# Patient Record
Sex: MALE | Race: WHITE | Employment: OTHER | URBAN - METROPOLITAN AREA
[De-identification: names, ages, dates, MRNs, and addresses within clinical notes are randomized per-mention and may not be internally consistent; named-entity substitution may affect disease eponyms.]

---

## 2017-05-12 ENCOUNTER — OFFICE VISIT (OUTPATIENT)
Dept: HEMATOLOGY | Age: 67
End: 2017-05-12

## 2017-05-12 VITALS
SYSTOLIC BLOOD PRESSURE: 157 MMHG | BODY MASS INDEX: 28.7 KG/M2 | OXYGEN SATURATION: 95 % | HEART RATE: 89 BPM | WEIGHT: 197.2 LBS | DIASTOLIC BLOOD PRESSURE: 99 MMHG | TEMPERATURE: 99.5 F

## 2017-05-12 DIAGNOSIS — K74.60 CIRRHOSIS OF LIVER WITHOUT ASCITES, UNSPECIFIED HEPATIC CIRRHOSIS TYPE (HCC): Primary | ICD-10-CM

## 2017-05-12 RX ORDER — POTASSIUM CHLORIDE 20 MEQ/1
20 TABLET, EXTENDED RELEASE ORAL DAILY
Status: ON HOLD | COMMUNITY
Start: 2017-04-17 | End: 2018-03-17

## 2017-05-12 RX ORDER — FUROSEMIDE 40 MG/1
40 TABLET ORAL DAILY
COMMUNITY
Start: 2017-04-20 | End: 2018-11-16

## 2017-05-12 NOTE — MR AVS SNAPSHOT
Visit Information Date & Time Provider Department Dept. Phone Encounter #  
 5/12/2017 12:30 PM Stef Solorzano MD [facility] [address] [facility] [id number] Follow-up Instructions Return in about 3 months (around 8/12/2017) for NP. Your Appointments 6/16/2017 10:15 AM  
Follow Up with Stef Solorzano MD  
[facility] [address] [place] ([facility] Appt Note: Follow up [address] [facility] [phone] [address]  
[phone]  
  
   
 [address] [facility] [address]  
  
    
 7/13/2017  2:00 PM  
PROCEDURE with Stef Solorzano MD  
[facility] [address] [place] ([facility]) Appt Note: EGD  
 [address] [facility] [phone] [facility] [address]  
[address] [facility] [address] [facility] [address]  
  
    
 8/3/2017  1:30 PM  
Follow Up with MICAELA Tyler [facility] [address] [place] ([facility]) Appt Note: Follow up [address] [facility] [phone] [facility] [address]  
[address] [facility] [address] Upcoming Health Maintenance Date Due Hepatitis C Screening 1950 DTaP/Tdap/Td series (1 - Tdap) 6/28/1969 FOBT Q 1 YEAR AGE 50-75 6/28/1998 ZOSTER VACCINE AGE 60> 6/28/2008 GLAUCOMA SCREENING Q2Y 6/28/2013 MEDICARE YEARLY EXAM 6/28/2013 Pneumococcal 65+ Low/Medium Risk (1 of 2 - PCV13) 3/7/2014 INFLUENZA AGE 9 TO ADULT 8/1/2017 Allergies as of 5/12/2017  Review Complete On: 5/12/2017 By: Sia Stanley Severity Noted Reaction Type Reactions Bryan Merry [Propoxyphene N-acetaminophen]  08/31/2012    Other (comments) Extreme emotions Librium [Chlordiazepoxide Hcl]  08/31/2012    Other (comments) Severe shaking and unable to control motor movements Current Immunizations  Reviewed on 3/6/2013 Name Date Influenza Vaccine 12/6/2012 Pneumococcal Polysaccharide (PPSV-23) 3/7/2013  1:26 PM  
  
 Not reviewed this visit You Were Diagnosed With   
  
 Codes Comments Cirrhosis of liver without ascites, unspecified hepatic cirrhosis type ([facility]    -  Primary ICD-10-CM: K74.60 ICD-9-CM: 571.5 Vitals BP Pulse Temp Weight(growth percentile) SpO2 BMI  
 (!) 157/99 89 99.5 °F (37.5 °C) (Tympanic) 197 lb 3.2 oz (89.4 kg) 95% 28.7 kg/m2 Smoking Status Former Smoker BMI and BSA Data Body Mass Index Body Surface Area 28.7 kg/m 2 2.09 m 2 Your Updated Medication List  
  
   
This list is accurate as of: 5/12/17  1:25 PM.  Always use your most recent med list.  
  
  
  
  
 CENTRUM 0.4-162-18 mg Tab Generic drug:  HB-GS-HW-Fe-Min-Lycopen-Lutein Take  by mouth daily. COREG 3.125 mg tablet Generic drug:  carvedilol Take  by mouth two (2) times a day. furosemide 40 mg tablet Commonly known as:  LASIX Take 40 mg by mouth daily. potassium chloride 20 mEq tablet Commonly known as:  K-DUR, KLOR-CON Take 20 mEq by mouth daily. We Performed the Following AFP WITH AFP-L3% [[id number] Custom] CBC WITH AUTOMATED DIFF [93709 CPT(R)] ETHYL ALCOHOL [id number] CPT(R)] HEPATIC FUNCTION PANEL [20094 CPT(R)] METABOLIC PANEL, BASIC [84103 CPT(R)] PROTHROMBIN TIME + INR [07887 CPT(R)] Follow-up Instructions Return in about 3 months (around 8/12/2017) for NP. To-Do List   
 05/12/2017 Imaging:  US ABD LTD   
  
 06/11/2017 GI:  EGD Introducing [facility]! Vladimir Alcaraz introduces [employer] patient portal. Now you can access parts of your medical record, email your doctor's office, and request medication refills online. 1. In your internet browser, go to [email or web address] 2. Click on the First Time User? Click Here link in the Sign In box. You will see the New Member Sign Up page. 3. Enter your [employer] Access Code exactly as it appears below. You will not need to use this code after youve completed the sign-up process. If you do not sign up before the expiration date, you must request a new code. · [employer] Access Code: [email or web address] Expires: 6/28/2017  1:40 PM 
 
4. Enter the last four digits of your Social Security Number (xxxx) and Date of Birth (mm/dd/yyyy) as indicated and click Submit. You will be taken to the next sign-up page. 5. Create a [employer] ID. This will be your [employer] login ID and cannot be changed, so think of one that is secure and easy to remember. 6. Create a [employer] password. You can change your password at any time. 7. Enter your Password Reset Question and Answer. This can be used at a later time if you forget your password. 8. Enter your e-mail address. You will receive e-mail notification when new information is available in [address] 9. Click Sign Up. You can now view and download portions of your medical record. 10. Click the Download Summary menu link to download a portable copy of your medical information. If you have questions, please visit the Frequently Asked Questions section of the [employer] website. Remember, [employer] is NOT to be used for urgent needs. For medical emergencies, dial 911. Now available from your iPhone and Android! Please provide this summary of care documentation to your next provider. Your primary care clinician is listed as Blanca B Shamar. If you have any questions after today's visit, please call [phone].

## 2017-05-12 NOTE — PROGRESS NOTES
[facility] [address], MD, [facility]     April GIOVANNI Gutierrez PA-C Tylene Port, MD, FACP, [address], NP        [address] [facility], [address] Aida Perez  22.     [phone]     FAX: [address] [place] [address], [address] [place] [address], [address]     [phone]     FAX: [phone]       Patient Care Team:  Kathy Pierce MD as PCP - General (Internal Medicine)  Isra Bowling MD (Gastroenterology)      Problem List  Date Reviewed: 10/3/2014          Codes Class Noted    Colon AV malformation ICD-10-CM: Q27.33  ICD-9-CM: 747.61  10/3/2014        Diverticulosis of colon ICD-10-CM: K57.30  ICD-9-CM: 562.10  10/3/2014        Colon polyps ICD-10-CM: K63.5  ICD-9-CM: 211.3  10/3/2014        S/P inguinal hernia repair ICD-10-CM: Z98.890, Z87.19  ICD-9-CM: V45.89  2014        Cirrhosis, alcoholic ([facility] VZP-17-KB: K70.30  ICD-9-CM: 571.2  2014        S/P hip replacement ICD-10-CM: P76.052  ICD-9-CM: V43.64  3/7/2013              Candi Vazquez returns to the [address] for management of 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 The active problem list, all pertinent past medical history, medications, endoscopic studies, radiologic findings and laboratory findings related to the liver disorder were reviewed with the patient. The patient has not been seen since . The patient is a 74 y.o.  male who was first found to have chronic liver disease and cirrhosis in . Ultrasound of the liver was performed in 2014. This suggests cirrhosis. No liver mass lesions noted. The patient has not developed any major complications of cirrhosis to date. The patient's wife  and he started consuming alcohol again in excess between -2017. He developed ascites.   He stopped consuming alcohol. Ascites resolved with step 1 diuretics. He now only consumes only 1-2  Beers per night. The most recent laboratory studies indicate that the AST is elevated, ALT is normal, ALP is elevated, tests of hepatic synthetic and metabolic function are normal, and the platelet count is depressed. The patient notes fatigue. The patient has no limitations in functional activities. The patient has not experienced problems concentrating, swelling of the abdomen, swelling of the lower extremities, hematemesis, hematochezia. ALLERGIES  Allergies   Allergen Reactions    Darvocet A500 [Propoxyphene N-Acetaminophen] Other (comments)     Extreme emotions    Librium [Chlordiazepoxide Hcl] Other (comments)     Severe shaking and unable to control motor movements       MEDICATIONS  Current Outpatient Prescriptions   Medication Sig    furosemide (LASIX) 40 mg tablet Take 40 mg by mouth daily.  potassium chloride (K-DUR, KLOR-CON) 20 mEq tablet Take 20 mEq by mouth daily.  carvedilol (COREG) 3.125 mg tablet Take  by mouth two (2) times a day.  SF-JS-LF-Fe-Min-Lycopen-Lutein (CENTRUM) 0.4-162-18 mg Tab Take  by mouth daily. No current facility-administered medications for this visit. SYSTEM REVIEW NOT RELATED TO LIVER DISEASE OR REVIEWED ABOVE:  Constitution systems: Negative for fever, chills, weight gain, weight loss. Eyes: Negative for visual changes. ENT: Negative for sore throat, painful swallowing. Respiratory: Negative for cough, hemoptysis, SOB. Cardiology: Negative for chest pain, palpitations. GI:  Negative for constipation or diarrhea. : Negative for urinary frequency, dysuria, hematuria, nocturia. Skin: Negative for rash. Hematology: Negative for easy bruising, blood clots. Musculo-skelatal: Negative for back pain, muscle pain, weakness. Neurologic: Negative for headaches, dizziness, vertigo, memory problems not related to HE.   Psychology: Negative for anxiety, depression. FAMILY HISTORY:  The father  of CHF. The mother  of breast cancer. There is no family history of liver disease. SOCIAL HISTORY:  The patient is . The patient has no children. The patient currently smokes 0-4 cigarettes daily. The patient has previously consumed alcohol in excess. The patient has been abstinent from alcohol since 2012. The patient used to work in restaurant management. The patient retired in . PHYSICAL EXAMINATION:  BP (!) 157/99  Pulse 89  Temp 99.5 °F (37.5 °C) (Tympanic)   Wt 197 lb 3.2 oz (89.4 kg)  SpO2 95%  BMI 28.7 kg/m2  General: No acute distress. Eyes: Sclera anicteric. ENT: No oral lesions. Thyroid normal.  Nodes: No adenopathy. Skin: No spider angiomata. No jaundice. No palmar erythema. Respiratory: Lungs clear to auscultation. Cardiovascular: Regular heart rate. No murmurs. No JVD. Abdomen: Soft non-tender. Liver size normal to percussion/palpation. Spleen not palpable. No obvious ascites. Extremities: No edema. No muscle wasting. No gross arthritic changes. Neurologic: Alert and oriented. Cranial nerves grossly intact. No asterixsis. LABORATORY STUDIES:  [facility] [address] [place] [address] Units 2017   WBC 3.4 - 10.8 x10E3/uL 6.2 5.5   ANC 1.4 - 7.0 x10E3/uL 4.1 3.8   HGB 12.6 - 17.7 g/dL 15.3 16.3    - 379 x10E3/uL 177 175   INR 0.8 - 1.2 1.4 (H) 1.3 (H)   AST 0 - 40 IU/L 55 (H) 55 (H)   ALT 0 - 44 IU/L 21 34   Alk Phos 39 - 117 IU/L 135 (H) 112   Bili, Total 0.0 - 1.2 mg/dL 2.8 (H) 0.9   Bili, Direct 0.00 - 0.40 mg/dL 1.08 (H) 0.29   Albumin 3.6 - 4.8 g/dL 3.5 (L) 4.4   BUN 8 - 27 mg/dL 6 (L) 10   Creat 0.76 - 1.27 mg/dL 0.63 (L) 0.84   Na 134 - 144 mmol/L 136 141   K 3.5 - 5.2 mmol/L 4.2 4.0   Cl 96 - 106 mmol/L 97 102   CO2 18 - 29 mmol/L 25 23   Glucose 65 - 99 mg/dL 88 97     SEROLOGIES:  2014. anti-HCV negative.     Serologies Latest Ref Rng & Units 8/4/2014   Hep A Ab, Total Negative Negative   Hep B Surface Ag Negative Negative   Hep B Core Ab, Total Negative Negative   Ferritin 30 - 400 ng/mL 295   Iron % Saturation 15 - 55 % 51   JOYCE, IFA  Negative   ASMCA 0 - 19 Units 14   Alpha-1 antitrypsin level 90 - 200 mg/dL 166     LIVER HISTOLOGY:  Not available or performed    ENDOSCOPIC PROCEDURES:  Not available or performed    RADIOLOGY:  12/2009. CT scan abdomen with and without IV contrast.  Normal appearing liver. No liver mass lesions. No dilated bile ducts. No ascites. 7/2014. Ultrasound of liver. Echogenic consistent with cirrhosis. No liver mass lesions. No dilated bile ducts. No ascites. 5/2017. Ultrasound of liver. Echogenic consistent with cirrhosis. No liver mass lesions. No dilated bile ducts. Moderate ascites. OTHER TESTING:  Not available or performed    ASSESSMENT AND PLAN:  Cirrhosis secondary to Alcohol. Will perform laboratory testing to monitor liver function and degree of liver injury. This will include BMP, Hepatic panel, CBC with platelet count, INR. The AST is elevated. Alkaline phosphatase is elevated. Liver function is depressed. Total bilirubin is elevated. Serum albumin is depressed. INR is   prolonged. The platelet count is normal.      Ascites has resolved with lasix alone and abstinecne. Esophageal varicies have not been previously assessed for with upper endoscopy. Will schedule for EGD to assess for varices and need for banding. Hepatic encephalopathy has not developed to date. There is no need for treatment with lactulose and/or xifaxan at this time. The patient was directed to continue all current medications at the current dosages. There are no contraindications for the patient to take any medications that are necessary for treatment of other medical issues. The patient was counseled regarding alcohol consumption.   He has been abstinent for several years with the exception of 2 brief periods. Thrombocytopenia secondary to cirrhosis. There is no evidence of overt bleeding. There is no indication for platelet transfusions or pharmacologic treatment to increase the platelet count. The need for vaccination against viral hepatitis A and B will be assessed with serologic and instituted as appropriate. [facility] screening has recently been performed and does not suggest [address] The next liver imaging study will be performed in 1/2015. All of the above issues were discussed with the patient. All questions were answered. The patient expressed a clear understanding of the above. [facility] 87 in 1 month.     Stef Solorzano MD  [facility] [place] of [address], [address] [place] [address], [address] [phone]

## 2017-05-13 LAB
ALBUMIN SERPL-MCNC: 3.5 G/DL (ref 3.6–4.8)
ALP SERPL-CCNC: 135 IU/L (ref 39–117)
ALT SERPL-CCNC: 21 IU/L (ref 0–44)
AST SERPL-CCNC: 55 IU/L (ref 0–40)
BASOPHILS # BLD AUTO: 0.1 X10E3/UL (ref 0–0.2)
BASOPHILS NFR BLD AUTO: 1 %
BILIRUB DIRECT SERPL-MCNC: 1.08 MG/DL (ref 0–0.4)
BILIRUB SERPL-MCNC: 2.8 MG/DL (ref 0–1.2)
BUN SERPL-MCNC: 6 MG/DL (ref 8–27)
BUN/CREAT SERPL: 10 (ref 10–24)
CALCIUM SERPL-MCNC: 8.9 MG/DL (ref 8.6–10.2)
CHLORIDE SERPL-SCNC: 97 MMOL/L (ref 96–106)
CO2 SERPL-SCNC: 25 MMOL/L (ref 18–29)
CREAT SERPL-MCNC: 0.63 MG/DL (ref 0.76–1.27)
EOSINOPHIL # BLD AUTO: 0.1 X10E3/UL (ref 0–0.4)
EOSINOPHIL NFR BLD AUTO: 2 %
ERYTHROCYTE [DISTWIDTH] IN BLOOD BY AUTOMATED COUNT: 15.2 % (ref 12.3–15.4)
GLUCOSE SERPL-MCNC: 88 MG/DL (ref 65–99)
HCT VFR BLD AUTO: 44.4 % (ref 37.5–51)
HGB BLD-MCNC: 15.3 G/DL (ref 12.6–17.7)
IMM GRANULOCYTES # BLD: 0 X10E3/UL (ref 0–0.1)
IMM GRANULOCYTES NFR BLD: 0 %
INR PPP: 1.4 (ref 0.8–1.2)
LYMPHOCYTES # BLD AUTO: 1 X10E3/UL (ref 0.7–3.1)
LYMPHOCYTES NFR BLD AUTO: 17 %
MCH RBC QN AUTO: 33.6 PG (ref 26.6–33)
MCHC RBC AUTO-ENTMCNC: 34.5 G/DL (ref 31.5–35.7)
MCV RBC AUTO: 98 FL (ref 79–97)
MONOCYTES # BLD AUTO: 0.9 X10E3/UL (ref 0.1–0.9)
MONOCYTES NFR BLD AUTO: 14 %
NEUTROPHILS # BLD AUTO: 4.1 X10E3/UL (ref 1.4–7)
NEUTROPHILS NFR BLD AUTO: 66 %
PLATELET # BLD AUTO: 177 X10E3/UL (ref 150–379)
POTASSIUM SERPL-SCNC: 4.2 MMOL/L (ref 3.5–5.2)
PROT SERPL-MCNC: 7.7 G/DL (ref 6–8.5)
PROTHROMBIN TIME: 14.9 SEC (ref 9.1–12)
RBC # BLD AUTO: 4.55 X10E6/UL (ref 4.14–5.8)
SODIUM SERPL-SCNC: 136 MMOL/L (ref 134–144)
WBC # BLD AUTO: 6.2 X10E3/UL (ref 3.4–10.8)

## 2017-05-15 LAB
AFP L3 MFR SERPL: 8.4 % (ref 0–9.9)
AFP SERPL-MCNC: 8.9 NG/ML (ref 0–8)
ETHANOL BLD GC-MCNC: 0.04 %

## 2017-05-19 ENCOUNTER — HOSPITAL ENCOUNTER (OUTPATIENT)
Dept: ULTRASOUND IMAGING | Age: 67
Discharge: HOME | End: 2017-05-19
Attending: INTERNAL MEDICINE
Payer: MEDICARE

## 2017-05-19 DIAGNOSIS — K74.60 CIRRHOSIS OF LIVER WITHOUT ASCITES, UNSPECIFIED HEPATIC CIRRHOSIS TYPE (HCC): ICD-10-CM

## 2017-05-19 PROCEDURE — 76705 ECHO EXAM OF ABDOMEN: CPT

## 2018-03-14 PROBLEM — K70.11 ASCITES DUE TO CHRONIC ALCOHOLIC HEPATITIS: Status: ACTIVE | Noted: 2018-03-14

## 2018-03-29 ENCOUNTER — OFFICE VISIT (OUTPATIENT)
Dept: SURGERY | Age: 68
End: 2018-03-29

## 2018-03-29 VITALS
SYSTOLIC BLOOD PRESSURE: 116 MMHG | BODY MASS INDEX: 30.02 KG/M2 | HEART RATE: 89 BPM | HEIGHT: 69 IN | WEIGHT: 202.7 LBS | DIASTOLIC BLOOD PRESSURE: 80 MMHG

## 2018-03-29 DIAGNOSIS — K70.31 ALCOHOLIC CIRRHOSIS OF LIVER WITH ASCITES (HCC): ICD-10-CM

## 2018-03-29 DIAGNOSIS — N62 GYNECOMASTIA, MALE: ICD-10-CM

## 2018-03-29 DIAGNOSIS — R59.1 LYMPHADENOPATHY: Primary | ICD-10-CM

## 2018-03-29 NOTE — PROGRESS NOTES
CC: knots in neck         Sore knots under nipples    ASAEL Barkley is a 69 y.o. male who was referred for painful bilateral gynecomastia and knot in right neck. The knot has been in his neck since last Fall and is getting smaller. He has noted an area on his left neck now. He denies any ear or teeth problems or any known infections. He has had no workup on neck masses. He is a past smoker and drinker. No sore throat  or hoarseness. He has also had some tender knots under his areola that have been shown to be gynecomastia on mmg. ROS   As outlined above    PE  Visit Vitals    /80 (BP 1 Location: Left arm, BP Patient Position: Sitting)    Pulse 89    Ht 5' 9\" (1.753 m)    Wt 202 lb 11.2 oz (91.9 kg)    BMI 29.93 kg/m2     Physical Exam   HENT:   Head:       No other lymph nodes are palpable in neck  Thyroid in not enlarged. Axilla without masses  Bilateral tender nodules under both areola    IMP  1)  Lymphadenopathy of neck  Because they are getting smaller, most likely benign but given his history he needs a CT scan of his neck to make sure he does not have a ENT cancer or other nodes. 2) benign gynecomastia because of liver failure    PLAN  1) CT scan of neck    2) suggested decreasing caffeine and chocolate and green tea and adding Vit E to his meds. He is already on a vitamin. He should try to get 600-800 international units a day .   Try that for two months, if no relief from pain may need to try Tamoxifen

## 2018-03-29 NOTE — LETTER
3/29/2018 5:37 PM 
 
Patient:  Solange Ortiz YOB: 1950 Date of Visit: 3/29/2018 Dear Laura Coreas MD 
[facility] [facility] [address] VIA Facsimile: [phone] 
 : Thank you for referring Mr. Adán Tellez to me for evaluation/treatment. Below are the relevant portions of my assessment and plan of care. CC: knots in neck Sore knots under nipples ASAEL Ortiz is a 69 y.o. male who was referred for painful bilateral gynecomastia and knot in right neck. The knot has been in his neck since last Fall and is getting smaller. He has noted an area on his left neck now. He denies any ear or teeth problems or any known infections. He has had no workup on neck masses. He is a past smoker and drinker. No sore throat  or hoarseness. He has also had some tender knots under his areola that have been shown to be gynecomastia on mmg. ROS As outlined above PE Visit Vitals  /80 (BP 1 Location: Left arm, BP Patient Position: Sitting)  Pulse 89  
 Ht 5' 9\" (1.753 m)  Wt 202 lb 11.2 oz (91.9 kg)  BMI 29.93 kg/m2 Physical Exam  
HENT:  
Head: No other lymph nodes are palpable in neck Thyroid in not enlarged. Axilla without masses Bilateral tender nodules under both areola IMP 1)  Lymphadenopathy of neck Because they are getting smaller, most likely benign but given his history he needs a CT scan of his neck to make sure he does not have a ENT cancer or other nodes. 2) benign gynecomastia because of liver failure PLAN 
1) CT scan of neck 2) suggested decreasing caffeine and chocolate and green tea and adding Vit E to his meds. He is already on a vitamin. He should try to get 600-800 international units a day . Try that for two months, if no relief from pain may need to try Tamoxifen If you have questions, please do not hesitate to call me. I look forward to following Mr. Louis Schaumann along with you. Sincerely, 
 
 
Carlton Gosselin, MD

## 2018-07-26 ENCOUNTER — OFFICE VISIT (OUTPATIENT)
Dept: SURGERY | Age: 68
End: 2018-07-26

## 2018-07-26 VITALS
HEART RATE: 93 BPM | DIASTOLIC BLOOD PRESSURE: 74 MMHG | BODY MASS INDEX: 29.04 KG/M2 | HEIGHT: 69 IN | WEIGHT: 196.1 LBS | SYSTOLIC BLOOD PRESSURE: 107 MMHG

## 2018-07-26 DIAGNOSIS — K70.11 ASCITES DUE TO CHRONIC ALCOHOLIC HEPATITIS: Primary | ICD-10-CM

## 2018-07-26 RX ORDER — SODIUM CHLORIDE, SODIUM LACTATE, POTASSIUM CHLORIDE, CALCIUM CHLORIDE 600; 310; 30; 20 MG/100ML; MG/100ML; MG/100ML; MG/100ML
200 INJECTION, SOLUTION INTRAVENOUS CONTINUOUS
Status: CANCELLED | OUTPATIENT
Start: 2018-07-26 | End: 2018-07-27

## 2018-07-26 NOTE — PROGRESS NOTES
[address] [facility], [address] [facility]  Phone: [phone] Fax: [phone]                                              HISTORY AND PHYSICAL EXAM    NAME: Amari Sousa  : 1950    Chief Complaint:   Peritoneal ascities    History: Amari Sousa is a 77 y.o. male with peritoneal ascites secondary to alcoholic cirrhosis. He is requiring every 3 week aspirations and presents for placement of a peritoneal catheter for drainage. His next drainage is . Past Medical History:   Diagnosis Date    Arrhythmia     intermittent tachycardia    Breast lump     bilateral 6-8 weeks    Congestive heart failure (HCC)     Hypertension     Liver disease     alcoholic cirrhosis    Psychiatric disorder     anxiety and depression     Past Surgical History:   Procedure Laterality Date    HX COLONOSCOPY      HX HEENT      oral surgery    HX HERNIA REPAIR Right 2009    ingunial    HX ORTHOPAEDIC  2012    REMOVAL OF PROSTALAC IMPLANT, REIMPLANTATION LEFT TOTAL HIP ARTHROPLASTY    REVISE TOTAL HIP REPLACEMENT Left 2013    TOTAL HIP ARTHROPLASTY Bilateral     bilateral hip replacement        Current Outpatient Prescriptions   Medication Sig Dispense Refill    potassium chloride (K-DUR, KLOR-CON) 20 mEq tablet Take 1 Tab by mouth two (2) times a day. 60 Tab 0    furosemide (LASIX) 40 mg tablet Take 40 mg by mouth daily.  carvedilol (COREG) 3.125 mg tablet Take  by mouth two (2) times a day.  GU-FU-II-Fe-Min-Lycopen-Lutein (CENTRUM) 0.4-162-18 mg Tab Take  by mouth daily.          Allergies   Allergen Reactions    Darvocet A500 [Propoxyphene N-Acetaminophen] Other (comments)     Extreme emotions    Librium [Chlordiazepoxide Hcl] Other (comments)     Severe shaking and unable to control motor movements     Social History     Social History    Marital status:      Spouse name: N/A    Number of children: N/A    Years of education: N/A     Occupational History  Not on file. Social History Main Topics    Smoking status: Former Smoker     Quit date: 2/27/2013    Smokeless tobacco: Never Used    Alcohol use Yes      Comment: last drink 2 1/2 weeks ago \"a beer\"    Drug use: No      Comment: H/O in the past    Sexual activity: Not on file     Other Topics Concern    Not on file     Social History Narrative     Family History   Problem Relation Age of Onset    Cancer Mother     Breast Cancer Mother     Heart Disease Father     Cancer Brother      lung       Patient Active Problem List   Diagnosis Code    S/P hip replacement Z96.649    S/P inguinal hernia repair Z98.890, Z87.19    Cirrhosis, alcoholic ([facility] E67.39    Colon AV malformation Q27.33    Diverticulosis of colon K57.30    Colon polyps K63.5    Ascites due to chronic alcoholic hepatitis J94.72       Review of Systems:  Review of Systems   Constitutional: Negative for chills, fever, malaise/fatigue and weight loss. HENT: Positive for tinnitus. Negative for congestion, ear discharge, ear pain, hearing loss, nosebleeds and sore throat. Eyes: Negative for blurred vision, double vision, pain, discharge and redness. Respiratory: Negative for cough, sputum production, shortness of breath and wheezing. Cardiovascular: Positive for leg swelling. Negative for chest pain and palpitations. Gastrointestinal: Negative for abdominal pain, blood in stool, constipation, diarrhea, heartburn, melena, nausea and vomiting. Genitourinary: Negative for frequency, hematuria and urgency. Musculoskeletal: Positive for joint pain. Negative for back pain and neck pain. Skin: Negative for itching and rash. Neurological: Negative for dizziness, tremors, focal weakness, seizures, weakness and headaches. Endo/Heme/Allergies: Does not bruise/bleed easily. Psychiatric/Behavioral: Negative for depression and memory loss. The patient is not nervous/anxious and does not have insomnia.         Physical Exam:  Visit Vitals    /74 (BP 1 Location: Left arm, BP Patient Position: Sitting)    Pulse 93    Ht 5' 9\" (1.753 m)    Wt 196 lb 1.6 oz (89 kg)    BMI 28.96 kg/m2       Physical Exam   Constitutional: He is oriented to person, place, and time. No distress. Thin white male with signs of muscle atrophy   HENT:   Head: Normocephalic and atraumatic. Right Ear: External ear normal.   Left Ear: External ear normal.   Nose: Nose normal.   Mouth/Throat: Oropharynx is clear and moist. No oropharyngeal exudate. Eyes: EOM are normal. Pupils are equal, round, and reactive to light. No scleral icterus. Neck: No thyromegaly present. Cardiovascular: Normal rate, regular rhythm and normal heart sounds. Exam reveals no friction rub. No murmur heard. Pulmonary/Chest: Effort normal and breath sounds normal. He has no wheezes. He has no rales. Abdominal: Soft. He exhibits distension and mass. There is no tenderness. Distended abdomen with fluid wave  Umbilical hernia, reducable with a 1.2 cm defect. Musculoskeletal: Normal range of motion. Lymphadenopathy:     He has no cervical adenopathy. Neurological: He is alert and oriented to person, place, and time. Skin: Skin is warm and dry. No rash noted. No erythema. Psychiatric: He has a normal mood and affect. His behavior is normal. Judgment and thought content normal.       Impression:  Peritoneal ascites secondary to alcoholic cirrhosis  Umbilical hernia. Plan:  Insertion of peritoneal catheter on 7/38/09  Risks and complications were explained to patient and they voiced understanding.     Alyse Bowens M.D.

## 2018-09-06 ENCOUNTER — OFFICE VISIT (OUTPATIENT)
Dept: SURGERY | Age: 68
End: 2018-09-06

## 2018-09-06 VITALS
HEIGHT: 69 IN | DIASTOLIC BLOOD PRESSURE: 72 MMHG | SYSTOLIC BLOOD PRESSURE: 96 MMHG | HEART RATE: 94 BPM | WEIGHT: 180.9 LBS | BODY MASS INDEX: 26.79 KG/M2

## 2018-09-06 DIAGNOSIS — Z99.2 PERITONEAL DIALYSIS CATHETER IN PLACE (HCC): ICD-10-CM

## 2018-09-06 DIAGNOSIS — K70.11 ASCITES DUE TO CHRONIC ALCOHOLIC HEPATITIS: Primary | ICD-10-CM

## 2018-09-07 NOTE — PROGRESS NOTES
Patient returns for follow up of peritoneal catheter placement for alcoholic ascites. He has used the catheter several times and is draining several liters weekly. No problems. He has not changed dressing. He is relating some complicated cleaning and reusing of the tubing, etc to drain the 2 liters as the bags are only one liter. He would like 2 liter bags. No c/o  Soreness better  Appetite good  Bowels okay      EXAM:  Wounds look good  No signs of infection    Ordered 9 catheters a month and 5 dressings a month. Stressed sterility and try to change the dressing frequently. I ordered 2 liter bags but that was not an option.   Talked about how to remove adhesive and change dressing      RTO prn

## 2018-11-13 ENCOUNTER — HOSPITAL ENCOUNTER (INPATIENT)
Age: 68
LOS: 1 days | Discharge: HOME HEALTH | DRG: 641 | End: 2018-11-16
Attending: EMERGENCY MEDICINE | Admitting: INTERNAL MEDICINE
Payer: MEDICARE

## 2018-11-13 ENCOUNTER — APPOINTMENT (OUTPATIENT)
Dept: MRI IMAGING | Age: 68
DRG: 641 | End: 2018-11-13
Attending: INTERNAL MEDICINE
Payer: MEDICARE

## 2018-11-13 DIAGNOSIS — H53.2 DOUBLE VISION: Primary | ICD-10-CM

## 2018-11-13 DIAGNOSIS — R42 DIZZINESS AND GIDDINESS: ICD-10-CM

## 2018-11-13 DIAGNOSIS — E72.20 HYPERAMMONEMIA (HCC): ICD-10-CM

## 2018-11-13 DIAGNOSIS — E87.1 HYPONATREMIA: ICD-10-CM

## 2018-11-13 DIAGNOSIS — K70.31 ALCOHOLIC CIRRHOSIS OF LIVER WITH ASCITES (HCC): ICD-10-CM

## 2018-11-13 DIAGNOSIS — I65.23 BILATERAL CAROTID ARTERY STENOSIS: ICD-10-CM

## 2018-11-13 DIAGNOSIS — E87.5 ACUTE HYPERKALEMIA: ICD-10-CM

## 2018-11-13 LAB
ALBUMIN SERPL-MCNC: 1.8 G/DL (ref 3.5–5)
ALBUMIN/GLOB SERPL: 0.5 {RATIO} (ref 1.1–2.2)
ALP SERPL-CCNC: 141 U/L (ref 45–117)
ALT SERPL-CCNC: 29 U/L (ref 12–78)
AMMONIA PLAS-SCNC: 43 UMOL/L
ANION GAP SERPL CALC-SCNC: 6 MMOL/L (ref 5–15)
ANION GAP SERPL CALC-SCNC: 7 MMOL/L (ref 5–15)
ANION GAP SERPL CALC-SCNC: 7 MMOL/L (ref 5–15)
APPEARANCE UR: CLEAR
AST SERPL-CCNC: 50 U/L (ref 15–37)
BACTERIA URNS QL MICRO: NEGATIVE /HPF
BILIRUB SERPL-MCNC: 1.6 MG/DL (ref 0.2–1)
BILIRUB UR QL CFM: NEGATIVE
BUN SERPL-MCNC: 21 MG/DL (ref 6–20)
BUN SERPL-MCNC: 22 MG/DL (ref 6–20)
BUN SERPL-MCNC: 22 MG/DL (ref 6–20)
BUN/CREAT SERPL: 24 (ref 12–20)
BUN/CREAT SERPL: 24 (ref 12–20)
BUN/CREAT SERPL: 25 (ref 12–20)
CALCIUM SERPL-MCNC: 7.6 MG/DL (ref 8.5–10.1)
CALCIUM SERPL-MCNC: 7.8 MG/DL (ref 8.5–10.1)
CALCIUM SERPL-MCNC: 8.1 MG/DL (ref 8.5–10.1)
CHLORIDE SERPL-SCNC: 100 MMOL/L (ref 97–108)
CHLORIDE SERPL-SCNC: 98 MMOL/L (ref 97–108)
CHLORIDE SERPL-SCNC: 98 MMOL/L (ref 97–108)
CO2 SERPL-SCNC: 21 MMOL/L (ref 21–32)
CO2 SERPL-SCNC: 21 MMOL/L (ref 21–32)
CO2 SERPL-SCNC: 22 MMOL/L (ref 21–32)
COLOR UR: ABNORMAL
CREAT SERPL-MCNC: 0.88 MG/DL (ref 0.7–1.3)
CREAT SERPL-MCNC: 0.89 MG/DL (ref 0.7–1.3)
CREAT SERPL-MCNC: 0.93 MG/DL (ref 0.7–1.3)
CREAT UR-MCNC: 204 MG/DL
EPITH CASTS URNS QL MICRO: ABNORMAL /LPF
ERYTHROCYTE [DISTWIDTH] IN BLOOD BY AUTOMATED COUNT: 14 % (ref 11.5–14.5)
GLOBULIN SER CALC-MCNC: 4 G/DL (ref 2–4)
GLUCOSE SERPL-MCNC: 102 MG/DL (ref 65–100)
GLUCOSE SERPL-MCNC: 106 MG/DL (ref 65–100)
GLUCOSE SERPL-MCNC: 98 MG/DL (ref 65–100)
GLUCOSE UR STRIP.AUTO-MCNC: NEGATIVE MG/DL
HCT VFR BLD AUTO: 38.7 % (ref 36.6–50.3)
HGB BLD-MCNC: 13.5 G/DL (ref 12.1–17)
HGB UR QL STRIP: NEGATIVE
HYALINE CASTS URNS QL MICRO: ABNORMAL /LPF (ref 0–5)
KETONES UR QL STRIP.AUTO: ABNORMAL MG/DL
LEUKOCYTE ESTERASE UR QL STRIP.AUTO: ABNORMAL
MCH RBC QN AUTO: 32.8 PG (ref 26–34)
MCHC RBC AUTO-ENTMCNC: 34.9 G/DL (ref 30–36.5)
MCV RBC AUTO: 93.9 FL (ref 80–99)
NITRITE UR QL STRIP.AUTO: NEGATIVE
NRBC # BLD: 0 K/UL (ref 0–0.01)
NRBC BLD-RTO: 0 PER 100 WBC
OSMOLALITY SERPL: 274 MOSM/KG H2O
OSMOLALITY UR: 927 MOSM/KG H2O
PH UR STRIP: 6 [PH] (ref 5–8)
PLATELET # BLD AUTO: 155 K/UL (ref 150–400)
PMV BLD AUTO: 8.9 FL (ref 8.9–12.9)
POTASSIUM SERPL-SCNC: 4.9 MMOL/L (ref 3.5–5.1)
POTASSIUM SERPL-SCNC: 5.4 MMOL/L (ref 3.5–5.1)
POTASSIUM SERPL-SCNC: 5.6 MMOL/L (ref 3.5–5.1)
PROT SERPL-MCNC: 5.8 G/DL (ref 6.4–8.2)
PROT UR STRIP-MCNC: NEGATIVE MG/DL
RBC # BLD AUTO: 4.12 M/UL (ref 4.1–5.7)
RBC #/AREA URNS HPF: ABNORMAL /HPF (ref 0–5)
SODIUM SERPL-SCNC: 126 MMOL/L (ref 136–145)
SODIUM SERPL-SCNC: 127 MMOL/L (ref 136–145)
SODIUM SERPL-SCNC: 127 MMOL/L (ref 136–145)
SODIUM UR-SCNC: 5 MMOL/L
SP GR UR REFRACTOMETRY: 1.03 (ref 1–1.03)
TROPONIN I SERPL-MCNC: <0.05 NG/ML
UA: UC IF INDICATED,UAUC: ABNORMAL
UROBILINOGEN UR QL STRIP.AUTO: 1 EU/DL (ref 0.2–1)
WBC # BLD AUTO: 8.8 K/UL (ref 4.1–11.1)
WBC URNS QL MICRO: ABNORMAL /HPF (ref 0–4)

## 2018-11-13 PROCEDURE — 36415 COLL VENOUS BLD VENIPUNCTURE: CPT

## 2018-11-13 PROCEDURE — 97116 GAIT TRAINING THERAPY: CPT

## 2018-11-13 PROCEDURE — 80053 COMPREHEN METABOLIC PANEL: CPT

## 2018-11-13 PROCEDURE — 81001 URINALYSIS AUTO W/SCOPE: CPT

## 2018-11-13 PROCEDURE — 74011250637 HC RX REV CODE- 250/637: Performed by: INTERNAL MEDICINE

## 2018-11-13 PROCEDURE — 82570 ASSAY OF URINE CREATININE: CPT

## 2018-11-13 PROCEDURE — 83930 ASSAY OF BLOOD OSMOLALITY: CPT

## 2018-11-13 PROCEDURE — 99218 HC RM OBSERVATION: CPT

## 2018-11-13 PROCEDURE — 74011250636 HC RX REV CODE- 250/636: Performed by: INTERNAL MEDICINE

## 2018-11-13 PROCEDURE — 85027 COMPLETE CBC AUTOMATED: CPT

## 2018-11-13 PROCEDURE — 70551 MRI BRAIN STEM W/O DYE: CPT

## 2018-11-13 PROCEDURE — 93880 EXTRACRANIAL BILAT STUDY: CPT

## 2018-11-13 PROCEDURE — 80048 BASIC METABOLIC PNL TOTAL CA: CPT

## 2018-11-13 PROCEDURE — G8978 MOBILITY CURRENT STATUS: HCPCS

## 2018-11-13 PROCEDURE — 82140 ASSAY OF AMMONIA: CPT

## 2018-11-13 PROCEDURE — 83935 ASSAY OF URINE OSMOLALITY: CPT

## 2018-11-13 PROCEDURE — 99283 EMERGENCY DEPT VISIT LOW MDM: CPT

## 2018-11-13 PROCEDURE — G8980 MOBILITY D/C STATUS: HCPCS

## 2018-11-13 PROCEDURE — G8979 MOBILITY GOAL STATUS: HCPCS

## 2018-11-13 PROCEDURE — 97161 PT EVAL LOW COMPLEX 20 MIN: CPT

## 2018-11-13 PROCEDURE — 84300 ASSAY OF URINE SODIUM: CPT

## 2018-11-13 PROCEDURE — 94761 N-INVAS EAR/PLS OXIMETRY MLT: CPT

## 2018-11-13 PROCEDURE — 84484 ASSAY OF TROPONIN QUANT: CPT

## 2018-11-13 RX ORDER — GUAIFENESIN 100 MG/5ML
81 LIQUID (ML) ORAL DAILY
Status: DISCONTINUED | OUTPATIENT
Start: 2018-11-13 | End: 2018-11-16 | Stop reason: HOSPADM

## 2018-11-13 RX ORDER — SODIUM CHLORIDE 9 MG/ML
75 INJECTION, SOLUTION INTRAVENOUS CONTINUOUS
Status: DISCONTINUED | OUTPATIENT
Start: 2018-11-13 | End: 2018-11-14

## 2018-11-13 RX ORDER — METOLAZONE 5 MG/1
5 TABLET ORAL DAILY
Status: DISCONTINUED | OUTPATIENT
Start: 2018-11-13 | End: 2018-11-13

## 2018-11-13 RX ORDER — ONDANSETRON 2 MG/ML
4 INJECTION INTRAMUSCULAR; INTRAVENOUS
Status: DISCONTINUED | OUTPATIENT
Start: 2018-11-13 | End: 2018-11-16 | Stop reason: HOSPADM

## 2018-11-13 RX ORDER — SODIUM CHLORIDE 0.9 % (FLUSH) 0.9 %
5-10 SYRINGE (ML) INJECTION EVERY 8 HOURS
Status: DISCONTINUED | OUTPATIENT
Start: 2018-11-13 | End: 2018-11-16 | Stop reason: HOSPADM

## 2018-11-13 RX ORDER — BISACODYL 5 MG
5 TABLET, DELAYED RELEASE (ENTERIC COATED) ORAL DAILY PRN
Status: DISCONTINUED | OUTPATIENT
Start: 2018-11-13 | End: 2018-11-16 | Stop reason: HOSPADM

## 2018-11-13 RX ORDER — SODIUM CHLORIDE 0.9 % (FLUSH) 0.9 %
5-10 SYRINGE (ML) INJECTION AS NEEDED
Status: DISCONTINUED | OUTPATIENT
Start: 2018-11-13 | End: 2018-11-16 | Stop reason: HOSPADM

## 2018-11-13 RX ORDER — FUROSEMIDE 40 MG/1
40 TABLET ORAL DAILY
Status: DISCONTINUED | OUTPATIENT
Start: 2018-11-13 | End: 2018-11-13

## 2018-11-13 RX ORDER — LABETALOL HCL 20 MG/4 ML
5 SYRINGE (ML) INTRAVENOUS
Status: DISCONTINUED | OUTPATIENT
Start: 2018-11-13 | End: 2018-11-16 | Stop reason: HOSPADM

## 2018-11-13 RX ORDER — LANOLIN ALCOHOL/MO/W.PET/CERES
400 CREAM (GRAM) TOPICAL DAILY
Status: DISCONTINUED | OUTPATIENT
Start: 2018-11-13 | End: 2018-11-16 | Stop reason: HOSPADM

## 2018-11-13 RX ORDER — CARVEDILOL 3.12 MG/1
3.12 TABLET ORAL 2 TIMES DAILY
Status: DISCONTINUED | OUTPATIENT
Start: 2018-11-13 | End: 2018-11-15

## 2018-11-13 RX ADMIN — Medication 10 ML: at 18:09

## 2018-11-13 RX ADMIN — ASPIRIN 81 MG 81 MG: 81 TABLET ORAL at 18:06

## 2018-11-13 RX ADMIN — Medication 400 MG: at 18:06

## 2018-11-13 RX ADMIN — Medication 10 ML: at 22:21

## 2018-11-13 RX ADMIN — SODIUM CHLORIDE 75 ML/HR: 900 INJECTION, SOLUTION INTRAVENOUS at 17:36

## 2018-11-13 RX ADMIN — Medication 10 ML: at 22:20

## 2018-11-13 NOTE — CONSULTS
[address] [facility]  MR#: [id number]  : 1950  ACCOUNT #: [de-identified]   DATE OF SERVICE: 2018    REFERRING PHYSICIAN:  Dr. Filippo Smith. REASON FOR CONSULTATION:  Hyperkalemia, hyponatremia. HISTORY OF PRESENT ILLNESS:  The patient is a 56-year-old  male with past medical history significant for alcoholic cirrhosis who has been transferred from [facility] for hyperkalemia and gait instability. The patient was found to have a potassium of 7.1 with sodium of 122 at [facility]. He was treated medically for his high potassium and his repeat potassium this morning is 5.6. Patient has known history of alcoholic cirrhosis. He has history of ascites. In August, he had ascitic drain placed. The patient has been draining every 7-8 days for about 6-7 liters of fluid. He drained himself on last Saturday, about 6 liters of fluid. The patient has been on diuretics as an outpatient. He is on Lasix and Aldactone. He was also on metolazone, which he has not taken recently. He is on KCl 20 mEq 2 tablets twice a day. He has been taking 80 mEq of KCl along with he has been eating peach every day. The patient looking at his old labs from 2018, his sodium was 138 and potassium was 3.30 at that time. Patient has history of hypertension. No history of diabetes or kidney stones. He denies any vomiting or diarrhea. No recent change in appetite. PAST MEDICAL HISTORY:  Hypertension, cirrhosis, alcoholic cirrhosis, anxiety, depression. PAST SURGICAL HISTORY:    1. Colonoscopy. 2.  Inguinal hernia repair. 3.  Left hip arthroplasty. SOCIAL HISTORY:  Ex-smoker, lives alone, . Last alcohol abuse in 2017. FAMILY HISTORY:  Positive for breast cancer to the mother. Father had heart disease. Brother had lung cancer. ALLERGIES:  DARVOCET AND ERYTHROMYCIN.     HOME MEDICATIONS:  Aldactone, Lasix, magnesium oxide, Coreg, multivitamin, metolazone, not used recently. REVIEW OF SYSTEMS:  As per HPI. Total 11 systems reviewed. They are essentially negative. Patient reports double vision and generalized weakness. No dysuria, hematuria, no urgency or frequency. No fever or chills. PHYSICAL EXAMINATION:  VITAL SIGNS:  Pulse rate 92, blood pressure 99/61, respiratory rate 20. GENERAL:  Patient is lying in bed, comfortable, not in apparent distress. Alert, awake, oriented x 3. NECK:  Supple. No palpable neck mass. EYES:  No conjunctival pallor or scleral icterus. Oral mucosa is dry. LUNGS:  Clear to auscultation bilaterally. No wheezing or crackles. CARDIOVASCULAR:  Normal S1, S2, regular rate and rhythm. No murmurs. ABDOMEN:  Soft, nontender, bowel sounds present. Abdominal drain present. EXTREMITIES:  No edema. NEUROLOGIC:  Nonfocal.  SKIN:  No rash. JOINTS:  No joint effusion or tenderness. LABORATORY AND DIAGNOSTIC DATA:  Labs at \[facility]\"" on 11/12/2018:  Sodium 122, potassium 7.1, BUN 23, creatinine 1.0, albumin 1.9. Repeat labs this morning:  Sodium 127, potassium 5.6, BUN 21, creatinine 0.89, hemoglobin 13.5. CT head:  He had acute intracranial process seen. EKG:  Normal sinus rhythm, no significant ST-T changes. IMPRESSION:  1. Hyperkalemia due to high potassium diet, potassium supplementation and Aldactone use. 2.  Hyponatremia, likely due to dehydration, although cirrhosis could be contributing to his hyponatremia. His urine out flow is 927 and urine sodium is 5 which suggests prerenal state. 3.  History of alcoholic cirrhosis. 4.  History of ascites requiring ascitic drain placement in 08/2018.  5.  History of hypertension. PLAN:    1. Start IV fluids, normal saline at 75 mL per hour. 2.  Stop KCl. 3.  Hold Aldactone. 4.  Hold Lasix and metolazone. Check BMP in the morning. 5.  Check TSH and cortisol level to finish the workup of hyponatremia. Thanks for consultation.   We will follow the patient with you.      MD CHRISTIAN Zelaya / [place]  D: 11/13/2018 11:02     T: 11/13/2018 11:34  JOB #: [id number]

## 2018-11-13 NOTE — PROGRESS NOTES
Speech pathology note Reviewed chart and note patient admitted with diplopia. Note MRI showed no acute intracranial process. Patient passed the STAND and a regular diet was ordered. Discussed case with RN who reported no SLP-related concerns. Formal SLP evaluation not clinically indicated at this time. Will sign off. Please re-consult if further needs arise. Thank you. Maurita Dance, Dixie Jing., CCC-SLP

## 2018-11-13 NOTE — CONSULTS
Jim Finley         NAME:David Castellanos  DLA:[id number]   [facility]         [id number]  Patient seen and examined    hypona  hyperk    Stop kcl  Stop lasix  Start ivf                  Michael Akhtar [facility]  [facility]  [address], Marycruz Pascual  [place], [address]  Phone - [phone]         Fax - [phone] [facility] Office  [address] [place] [address]  Phone - [phone]        Fax - [phone]     [email or web address]

## 2018-11-13 NOTE — PROGRESS NOTES
Pharmacy Medication Reconciliation The patient was interviewed regarding current PTA medication list, use and drug allergies;  patient and patient's niece were present in room and obtained permission from patient to discuss drug regimen with visitor(s) present. The patient was questioned regarding use of any other inhalers, topical products, over the counter medications, herbal medications, vitamin products or ophthalmic/nasal/otic medication use. Allergy Update: Davocet A500, librium azithromycin Recommendations/Findings: The following amendments were made to the patient[facility] [id number] [facility]:  
1) Additions: none 2) Deletions:  
-metolazone 5mg --not a current medication 
-spironolactone 25mg --not a current medication 3) Changes: (Old regimen: carvedilol 3.125mg PO BiD /New regimen: carvedilol 3.125mg PO QHs) 
(Old regimen: potassium chloride 20meq bid /New regimen: potassium chloride 40meq po BID) -Clarified PTA med list with patient interview, and RX insurance query. PTA medication list was corrected to the following:  
 
Prior to Admission Medications Prescriptions Last Dose Informant Patient Reported? Taking? TA-SS-OZ-Fe-Min-Lycopen-Lutein (CENTRUM) 0.4-162-18 mg Tab 11/12/2018 at Unknown time  Yes Yes Sig: Take 1 Tab by mouth daily. carvedilol (COREG) 3.125 mg tablet 11/12/2018 at 2100  Yes Yes Sig: Take 3.125 mg by mouth nightly. furosemide (LASIX) 40 mg tablet   Yes Yes Sig: Take 40 mg by mouth daily. magnesium oxide (MAG-OX) 400 mg tablet 11/12/2018 at Unknown time Self Yes Yes Sig: Take 400 mg by mouth daily. Indications: hypomagnesemia  
potassium chloride (K-DUR, KLOR-CON) 20 mEq tablet 11/12/2018 at Unknown time  No Yes Sig: Take 1 Tab by mouth two (2) times a day. Patient taking differently: Take 40 mEq by mouth two (2) times a day. Facility-Administered Medications: None Thank you, PAULA Del Rosario

## 2018-11-13 NOTE — H&P
Hospitalist Admission NoteNAME: Giselle Ruiz :  1950 MRN:  [id number] Date/Time:  2018 2:58 AM 
 
Patient PCP: Marco Browne MD 
______________________________________________________________________ Given the patient's current clinical presentation, I have a high level of concern for decompensation if discharged from the emergency department. Complex decision making was performed, which includes reviewing the patient's available past medical records, laboratory results, and x-ray films. My assessment of this patient's clinical condition and my plan of care is as follows. Assessment / Plan: 
 
Gait instability ,chronic intermittent blurry vision   
-neuro consult at Am  
-stroke work up  
-Brain MRI Hyperkalemia  
-admit to tele unit  
-hold Aldactone  
-Hold KCL  
-follow up repeat  
-kayexalate Hyponatremia  
-check serum osmolarity and urine osmolarity  
-check urine NA  
-nephrology consult  
-check NA level Q8 hrs Liver cirrhosis  
-check ammonia level HTN  
-continue home antihypertensive med Code Status: full code Surrogate Decision Maker: DVT Prophylaxis: SCD 
GI Prophylaxis: not indicated Baseline: independent Subjective: CHIEF COMPLAINT: gait instability HISTORY OF PRESENT ILLNESS:    
Pily Funes is a 77 y.o.  male with Liver cirrhosis ,HTN , who transferred from [facility] ED because of gait instability and hyperkalemia 7.1 , according to the patient he had gait instability today and generalized weakness , patient also complaining from double vision intermittent last episode was last Thursday , denies any Dizziness any headache any nausea any vomiting . 'We were asked to admit for work up and evaluation of the above problems. Past Medical History:  
Diagnosis Date  Arrhythmia   
 intermittent tachycardia  Breast lump   
 bilateral 6-8 weeks  Congestive heart failure ([address]  Hypertension  Liver disease   
 alcoholic cirrhosis  Psychiatric disorder   
 anxiety and depression Past Surgical History:  
Procedure Laterality Date  HX COLONOSCOPY    
 HX HEENT    
 oral surgery  HX HERNIA REPAIR Right 2009  
 ingunial  
 HX ORTHOPAEDIC  2012 REMOVAL OF PROSTALAC IMPLANT, REIMPLANTATION LEFT TOTAL HIP ARTHROPLASTY  REVISE TOTAL HIP REPLACEMENT Left 2013  TOTAL HIP ARTHROPLASTY Bilateral   
 bilateral hip replacement Social History Tobacco Use  Smoking status: Former Smoker Last attempt to quit: 2013 Years since quittin.7  Smokeless tobacco: Never Used Substance Use Topics  Alcohol use: No  
  Comment: last  ETOH Brina Castellanos  Family History Problem Relation Age of Onset  Cancer Mother  Breast Cancer Mother  Heart Disease Father  Cancer Brother   
     lung Allergies Allergen Reactions  Darvocet A500 [Propoxyphene N-Acetaminophen] Other (comments) Extreme emotions  Librium [Chlordiazepoxide Hcl] Other (comments) Severe shaking and unable to control motor movements  Azithromycin Diarrhea Reduced Normal Whitney in GI TRACT Prior to Admission medications Medication Sig Start Date End Date Taking? Authorizing Provider  
spironolactone (ALDACTONE) 25 mg tablet Take 25 mg by mouth daily. Indications: Ascites    Blanca Ibanez MD  
metOLazone (ZAROXOLYN) 5 mg tablet Take 5 mg by mouth daily. Indications: edema    Blanca Ibanez MD  
magnesium oxide (MAG-OX) 400 mg tablet Take 400 mg by mouth daily. Indications: hypomagnesemia    Blanca Ibanez MD  
potassium chloride (K-DUR, KLOR-CON) 20 mEq tablet Take 1 Tab by mouth two (2) times a day. 3/17/18   Phyllis No, NP  
furosemide (LASIX) 40 mg tablet Take 40 mg by mouth daily. 17   Provider, Historical  
carvedilol (COREG) 3.125 mg tablet Take  by mouth two (2) times a day. Provider, Historical  
KO-HC-UX-Fe-Min-Lycopen-Lutein (CENTRUM) 0.4-162-18 mg Tab Take  by mouth daily. Provider, Historical  
 
 
REVIEW OF SYSTEMS:    
I am not able to complete the review of systems because: The patient is intubated and sedated The patient has altered mental status due to his acute medical problems The patient has baseline aphasia from prior stroke(s) The patient has baseline dementia and is not reliable historian The patient is in acute medical distress and unable to provide information Total of 12 systems reviewed as follows:   
   POSITIVE= underlined text  Negative = text not underlined General:  fever, chills, sweats, generalized weakness, weight loss/gain,  
   loss of appetite Eyes:    blurred vision, eye pain, loss of vision, double vision ENT:    rhinorrhea, pharyngitis Respiratory:   cough, sputum production, SOB, BEYER, wheezing, pleuritic pain  
Cardiology:   chest pain, palpitations, orthopnea, PND, edema, syncope Gastrointestinal:  abdominal pain , N/V, diarrhea, dysphagia, constipation, bleeding Genitourinary:  frequency, urgency, dysuria, hematuria, incontinence Muskuloskeletal :  arthralgia, myalgia, back pain Hematology:  easy bruising, nose or gum bleeding, lymphadenopathy Dermatological: rash, ulceration, pruritis, color change / jaundice Endocrine:   hot flashes or polydipsia Neurological:  headache, dizziness, confusion, focal weakness, paresthesia,Gait instability Speech difficulties, memory loss, gait difficulty Psychological: Feelings of anxiety, depression, agitation Objective: VITALS:   
There were no vitals taken for this visit. PHYSICAL EXAM: 
 
General:    Alert, cooperative, no distress, appears stated age. HEENT: Atraumatic, anicteric sclerae, pink conjunctivae No oral ulcers, mucosa moist, throat clear, dentition fair Neck:  Supple, symmetrical,  thyroid: non tender Lungs:   Clear to auscultation bilaterally. No Wheezing or Rhonchi. No rales. Chest wall:  No tenderness  No Accessory muscle use. Heart:   Regular  rhythm,  No  murmur   No edema Abdomen:   Soft, non-tender. Not distended. Bowel sounds normal ,ascitic fluid drain in place no erythema Extremities: No cyanosis. No clubbing,   
  Skin turgor normal, Capillary refill normal, Radial dial pulse 2+ Skin:     Not pale. Not Jaundiced  No rashes Psych:  Good insight. Not depressed. Not anxious or agitated. Neurologic: EOMs intact. No facial asymmetry. No aphasia or slurred speech. Symmetrical strength, Sensation grossly intact. Alert and oriented X 4.  
 
_______________________________________________________________________ Care Plan discussed with: 
  Comments Patient y Family RN Care Manager Consultant:     
_______________________________________________________________________ Expected  Disposition:  
Home with Family y HH/PT/OT/RN   
SNF/LTC   
[facility]   
________________________________________________________________________ TOTAL TIME:  60  Minutes Critical Care Provided     Minutes non procedure based Comments  
 y Reviewed previous records  
>50% of visit spent in counseling and coordination of care y Discussion with patient and/or family and questions answered 
  
 
________________________________________________________________________ Signed: Alma Delia Oak, MD 
 
Procedures: see electronic medical records for all procedures/Xrays and details which were not copied into this note but were reviewed prior to creation of Plan. LAB DATA REVIEWED:   
Recent Results (from the past 24 hour(s)) METABOLIC PANEL, COMPREHENSIVE Collection Time: 11/12/18  7:18 PM  
Result Value Ref Range Sodium 122 (L) 136 - 145 mmol/L Potassium 7.1 (HH) 3.5 - 5.1 mmol/L Chloride 95 (L) 97 - 108 mmol/L  
 CO2 21 21 - 32 mmol/L  Anion gap 6 5 - 15 mmol/L  
 Glucose 88 65 - 100 mg/dL BUN 23 (H) 6 - 20 MG/DL Creatinine 1.08 0.70 - 1.30 MG/DL  
 BUN/Creatinine ratio 21 (H) 12 - 20 GFR est AA >60 >60 ml/min/1.73m2 GFR est non-AA >60 >60 ml/min/1.73m2 Calcium 8.3 (L) 8.5 - 10.1 MG/DL Bilirubin, total 1.8 (H) 0.2 - 1.0 MG/DL  
 ALT (SGPT) 31 12 - 78 U/L  
 AST (SGOT) 58 (H) 15 - 37 U/L Alk. phosphatase 169 (H) 45 - 117 U/L Protein, total 6.4 6.4 - 8.2 g/dL Albumin 1.9 (L) 3.5 - 5.0 g/dL Globulin 4.5 (H) 2.0 - 4.0 g/dL A-G Ratio 0.4 (L) 1.1 - 2.2    
CBC WITH AUTOMATED DIFF Collection Time: 11/12/18  7:18 PM  
Result Value Ref Range WBC 9.4 4.1 - 11.1 K/uL  
 RBC 4.53 4.10 - 5.70 M/uL  
 HGB 14.5 12.1 - 17.0 g/dL HCT 41.8 36.6 - 50.3 % MCV 92.3 80.0 - 99.0 FL  
 MCH 32.0 26.0 - 34.0 PG  
 MCHC 34.7 30.0 - 36.5 g/dL  
 RDW 13.7 11.5 - 14.5 % PLATELET 230 821 - 304 K/uL MPV 8.6 (L) 8.9 - 12.9 FL  
 NRBC 0.0 0  WBC ABSOLUTE NRBC 0.00 0.00 - 0.01 K/uL NEUTROPHILS 81 (H) 32 - 75 % LYMPHOCYTES 5 (L) 12 - 49 % MONOCYTES 13 5 - 13 % EOSINOPHILS 1 0 - 7 % BASOPHILS 0 0 - 1 % IMMATURE GRANULOCYTES 0 0.0 - 0.5 % ABS. NEUTROPHILS 7.6 1.8 - 8.0 K/UL  
 ABS. LYMPHOCYTES 0.5 (L) 0.8 - 3.5 K/UL  
 ABS. MONOCYTES 1.2 (H) 0.0 - 1.0 K/UL  
 ABS. EOSINOPHILS 0.1 0.0 - 0.4 K/UL  
 ABS. BASOPHILS 0.0 0.0 - 0.1 K/UL  
 ABS. IMM. GRANS. 0.0 0.00 - 0.04 K/UL  
 DF MANUAL PLATELET COMMENTS ADEQUATE PLATELETS    
 RBC COMMENTS NORMOCYTIC, NORMOCHROMIC PROTHROMBIN TIME + INR Collection Time: 11/12/18  7:18 PM  
Result Value Ref Range INR 1.7 (H) 0.9 - 1.1 Prothrombin time 16.4 (H) 9.0 - 11.1 sec TROPONIN I Collection Time: 11/12/18  7:18 PM  
Result Value Ref Range Troponin-I, Qt. <0.05 <0.05 ng/mL BNP Collection Time: 11/12/18  7:18 PM  
Result Value Ref Range  (H) 0 - 100 pg/mL SAMPLES BEING HELD Collection Time: 11/12/18  7:18 PM  
Result Value Ref Range SAMPLES BEING HELD 1RED COMMENT Add-on orders for these samples will be processed based on acceptable specimen integrity and analyte stability, which may vary by analyte. EKG, 12 LEAD, INITIAL Collection Time: 11/12/18  8:14 PM  
Result Value Ref Range Ventricular Rate 69 BPM  
 Atrial Rate 69 BPM  
 P-R Interval 178 ms QRS Duration 78 ms Q-T Interval 400 ms QTC Calculation (Bezet) 428 ms Calculated P Axis 11 degrees Calculated R Axis 5 degrees Calculated T Axis -2 degrees Diagnosis Normal sinus rhythm Low voltage QRS Inferior infarct , age undetermined Cannot rule out Anterior infarct , age undetermined Abnormal ECG When compared with ECG of 30-NOV-2012 14:57, 
QRS voltage has decreased Minimal criteria for Anterior infarct are now present Inferior infarct is now present T wave amplitude has decreased in Lateral leads GLUCOSE, POC Collection Time: 11/12/18 10:32 PM  
Result Value Ref Range Glucose (POC) 126 (H) 65 - 100 mg/dL Performed by INA VERA(PCT) POTASSIUM Collection Time: 11/12/18 10:45 PM  
Result Value Ref Range Potassium 5.5 (H) 3.5 - 5.1 mmol/L METABOLIC PANEL, BASIC Collection Time: 11/13/18  2:02 AM  
Result Value Ref Range Sodium 126 (L) 136 - 145 mmol/L Potassium 5.4 (H) 3.5 - 5.1 mmol/L Chloride 98 97 - 108 mmol/L  
 CO2 21 21 - 32 mmol/L Anion gap 7 5 - 15 mmol/L Glucose 98 65 - 100 mg/dL BUN 22 (H) 6 - 20 MG/DL Creatinine 0.93 0.70 - 1.30 MG/DL  
 BUN/Creatinine ratio 24 (H) 12 - 20 GFR est AA >60 >60 ml/min/1.73m2 GFR est non-AA >60 >60 ml/min/1.73m2  Calcium 7.8 (L) 8.5 - 10.1 MG/DL

## 2018-11-13 NOTE — PROGRESS NOTES
[address] [facility]  Preliminary Report:  Carotid Duplex Scan Right: 
Minimal plaque noted in the right carotid system. Right ICA velocities suggest less than 50% diameter reduction. Right vertebral artery flow is antegrade. Left: 
Mild plaque noted in the left carotid system. Left ICA velocities suggest less than 50% diameter reduction. Left vertebral artery flow is antegrade. Final report to follow.

## 2018-11-13 NOTE — PROGRESS NOTES
physical Therapy EVALUATION/DISCHARGE Patient: Giselle Ruiz (5 y.o. male) Date: 11/13/2018 Primary Diagnosis: Hyperkalemia Precautions:     
ASSESSMENT : 
Based on the objective data described below, the patient presents with minimal decline from his independent baseline with significant improvement in function since admission. Cleared for session by RN. Pt lives by himself in a one story home with 3 steps to enter. He uses a SPC for amb due to old B hip replacement surgery. He is independent in ADLs. + . He supportive family and friends. Pt currently independent with bed mobility and transfers. He is able to amb with SPC with S in wise with minor trunk sway from baseline by his report but steady gait and no path deviation. He scores 27/28 on the Tinetti balance scale placing him in the low fall risk category. Pt was able to ascend/descend one step with min A. Pt returned to bed with call bell in reach and nurse aware. At this time, pt is making good progress toward his baseline. Talked with pt and family member in room. They state pt is able to have S upon d/c for safety. Pt may benefit from HHPT safety eval if function not fully back to baseline as pt was active in and out of home. If continues to progress quickly and feels at full baseline, would not be in need of HHPT. No further skilled PT needs in the acute setting. Pt will benefit from mobilization/amb with S of family and nursing throughout the day. PLAN : 
Discharge Recommendations: Home Health vs none dependent upon progress Further Equipment Recommendations for Discharge: straight cane SUBJECTIVE:  
Patient stated I feel much better today.  OBJECTIVE DATA SUMMARY:  
HISTORY:   
Past Medical History:  
Diagnosis Date  Arrhythmia   
 intermittent tachycardia  Breast lump   
 bilateral 6-8 weeks  Congestive heart failure ([address]  Hypertension  Liver disease   
 alcoholic cirrhosis  Psychiatric disorder   
 anxiety and depression Past Surgical History:  
Procedure Laterality Date  HX COLONOSCOPY    
 HX HEENT  1980's  
 oral surgery  HX HERNIA REPAIR Right 2009  
 ingunial  
 HX ORTHOPAEDIC  12/7/2012 REMOVAL OF PROSTALAC IMPLANT, REIMPLANTATION LEFT TOTAL HIP ARTHROPLASTY  REVISE TOTAL HIP REPLACEMENT Left 03/2013  TOTAL HIP ARTHROPLASTY Bilateral   
 bilateral hip replacement Prior Level of Function/Home Situation: Pt lives by himself in a one story home with 3 steps to enter. He uses a SPC for amb due to old B hip replacement surgery. He is independent in ADLs. + . He supportive family and friends. Personal factors and/or comorbidities impacting plan of care:  
 
Home Situation Home Environment: Private residence # Steps to Enter: 3 Rails to Enter: No 
One/Two Story Residence: One story Living Alone: Yes Support Systems: Family member(s) Patient Expects to be Discharged to[de-identified] Private residence Current DME Used/Available at Home: latrice Mejía Terrill Sheng EXAMINATION/PRESENTATION/DECISION MAKING:  
Critical Behavior: 
Neurologic State: Alert Orientation Level: Oriented X4 Cognition: Follows commands Hearing: Auditory Auditory Impairment: None Range Of Motion: 
AROM: Within functional limits PROM: Within functional limits Strength:   
Strength: Within functional limits Tone & Sensation:  
  
  
  
  
  
Sensation: Intact Coordination: 
Coordination: Within functional limits Vision:  
 no longer c/o double vision Functional Mobility: 
Bed Mobility: 
Rolling: Independent Supine to Sit: Independent Sit to Supine: Independent Transfers: 
Sit to Stand: Independent Stand to Sit: Independent Balance:  
Sitting: Intact Standing: Impaired Standing - Static: Good Standing - Dynamic : Fair Ambulation/Gait Training: 
Distance (ft): 150 Feet (ft) Assistive Device: Gait belt;Cane, straight Ambulation - Level of Assistance: Supervision Gait Abnormalities: Trunk sway increased(slight incr in sway but no LOB, exhibits steady gait) Speed/Yoly: Slow Stairs: 
Number of Stairs Trained: 1 Stairs - Level of Assistance: Minimum assistance Rail Use: Right Functional Measure: 
Tinetti test: 
 
Sitting Balance: 1 Arises: 2 Attempts to Rise: 2 Immediate Standing Balance: 2 Standing Balance: 2 Nudged: 2 Eyes Closed: 1 Turn 360 Degrees - Continuous/Discontinuous: 1 Turn 360 Degrees - Steady/Unsteady: 1 Sitting Down: 2 Balance Score: 16 Indication of Gait: 1 
R Step Length/Height: 1 L Step Length/Height: 1 
R Foot Clearance: 1 L Foot Clearance: 1 Step Symmetry: 1 Step Continuity: 1 Path: 2 Trunk: 1 Walking Time: 1 Gait Score: 11 Total Score: 27 Tinetti Test and G-code impairment scale: 
Percentage of Impairment CH 
 
0% 
 CI 
 
1-19% CJ 
 
20-39% CK 
 
40-59% CL 
 
60-79% CM 
 
80-99% CN  
 
100% Tinetti Score 0-28 28 23-27 17-22 12-16 6-11 1-5 0 Tinetti Tool Score Risk of Falls 
<19 = High Fall Risk 19-24 = Moderate Fall Risk 25-28 = Low Fall Risk Tinetti ME. Performance-Oriented Assessment of Mobility Problems in Elderly Patients. [address]; [id number]. (Scoring Description: PT Bulletin Feb. 10, 1993) Older adults: Cullen Gonzales et al, 2009; n = [address] [facility] elderly evaluated with ABC, DANNY, ADL, and IADL) · Mean DANNY score for males aged 69-68 years = 26.21(3.40) · Mean DANNY score for females age 69-68 years = 25.16(4.30) · Mean DANNY score for males over 80 years = 23.29(6.02) · Mean DANNY score for females over 80 years = 17.20(8.32) G codes: In compliance with CMSs Claims Based Outcome Reporting, the following G-code set was chosen for this patient based on their primary functional limitation being treated:  
 
The outcome measure chosen to determine the severity of the functional limitation was the tinetti with a score of 27/28 which was correlated with the impairment scale. ? Mobility - Walking and Moving Around:  
  - CURRENT STATUS: CI - 1%-19% impaired, limited or restricted  - GOAL STATUS: CI - 1%-19% impaired, limited or restricted  - D/C STATUS:  CI - 1%-19% impaired, limited or restricted Physical Therapy Evaluation Charge Determination History Examination Presentation Decision-Making MEDIUM  Complexity : 1-2 comorbidities / personal factors will impact the outcome/ POC  HIGH Complexity : 4+ Standardized tests and measures addressing body structure, function, activity limitation and / or participation in recreation  LOW Complexity : Stable, uncomplicated  LOW Complexity : FOTO score of  Based on the above components, the patient evaluation is determined to be of the following complexity level: LOW Pain: 
Pain Scale 1: Numeric (0 - 10) Pain Intensity 1: 0 Activity Tolerance:  
Good for session; no c/o pain, SOB, or dizziness Please refer to the flowsheet for vital signs taken during this treatment. After treatment:  
[]   Patient left in no apparent distress sitting up in chair 
[x]   Patient left in no apparent distress in bed 
[x]   Call bell left within reach [x]   Nursing notified 
[]   Caregiver present 
[]   Bed alarm activated COMMUNICATION/EDUCATION:  
Communication/Collaboration: 
[x]   Fall prevention education was provided and the patient/caregiver indicated understanding. [x]   Patient/family have participated as able and agree with findings and recommendations. []   Patient is unable to participate in plan of care at this time. Thank you for this referral. 
Carlos Henriquez, PT Time Calculation: 27 mins

## 2018-11-13 NOTE — ED NOTES
Pt transported to vascular, pt transferred from bed to w/c without difficulty. Pt stated \"i feel better then when I got here\"

## 2018-11-13 NOTE — CONSULTS
NEUROLOGY NOTE     Chief Complaint   Patient presents with   [address]     Patient complaining of diplopia    Abnormal Lab Results     Hyperkalemia       Reason for Consult  I have been asked to see the patient in neurological consultation by Srinivasan Hurtado MD to render advice and opinion regarding possible stroke    HPI  Belkys Dasilva is a 77 y.o. male who presents to the hospital because of increasing weakness, ataxia and diplopia. He states that he has been having generalized weakness and it was quite significant yesterday and hence he came to [facility]. He has had 3 episodes of diplopia lasting for a minute and then resolved. Last episode was on Thursday. During these episodes, there was no focal weakness, sensory problems or any problems with speech. He has been tx to [facility]. Mri of the brain is normal. He was also found to be he hyperkalemic, hyponatremia and hyperammonemia.      ROS  A ten system review of constitutional, cardiovascular, respiratory, musculoskeletal, endocrine, skin, SHEENT, genitourinary, psychiatric and neurologic systems was obtained and is unremarkable except as stated in HPI     PMH  Past Medical History:   Diagnosis Date    Arrhythmia     intermittent tachycardia    Breast lump     bilateral 6-8 weeks    Congestive heart failure ([address]     Hypertension     Liver disease     alcoholic cirrhosis    Psychiatric disorder     anxiety and depression       FH  Family History   Problem Relation Age of Onset    Cancer Mother     Breast Cancer Mother     Heart Disease Father     Cancer Brother         lung       SH  Social History     Socioeconomic History    Marital status:      Spouse name: Not on file    Number of children: Not on file    Years of education: Not on file    Highest education level: Not on file   Social Needs    Financial resource strain: Not on file    Food insecurity - worry: Not on file    Food insecurity - inability: Not on file   Martin Oliver Transportation needs - medical: Not on file   [employer] needs - non-medical: Not on file   Occupational History    Not on file   Tobacco Use    Smoking status: Former Smoker     Last attempt to quit: 2013     Years since quittin.7    Smokeless tobacco: Never Used   Substance and Sexual Activity    Alcohol use: No     Comment: last  ETOH Brina Castellanos     Drug use: No     Comment: H/O in the past    Sexual activity: Not on file   Other Topics Concern    Not on file   Social History Narrative    Not on file       ALLERGIES  Allergies   Allergen Reactions    Darvocet A500 [Propoxyphene N-Acetaminophen] Other (comments)     Extreme emotions    Librium [Chlordiazepoxide Hcl] Other (comments)     Severe shaking and unable to control motor movements    Azithromycin Diarrhea     Reduced Normal Whitney in GI TRACT       PHYSICAL EXAMINATION:   Patient Vitals for the past 24 hrs:   Temp Pulse Resp BP SpO2   18 0641  76 19  100 %   18 0607  74 16  99 %   18 0345  89 13  96 %   18 0330  96 15  97 %   18 0315  91 16  99 %   18 0300  93 24  99 %   18 0245  85 16  98 %   18 0230  92 16  100 %   18 0215  89 15  100 %        General:   General appearance: Pt is in no acute distress   Distal pulses are preserved  Fundoscopic exam: attempted    Neurological Examination:   Mental Status:  AAO x3. Speech is fluent. Follows commands, has normal fund of knowledge, attention, short term recall, comprehension and insight. Cranial Nerves: Visual fields are full. PERRL, Extraocular movements are full. Facial sensation intact. Facial movement intact. Hearing intact to conversation. Palate elevates symmetrically. Shoulder shrug symmetric. Tongue midline. Motor: Strength is 5/5 in all 4 ext. Normal tone. No atrophy. Sensation: Normal to light touch    Reflexes: DTRs 2+ throughout. Plantar responses downgoing. Coordination/Cerebellar: Intact to finger-nose-finger     Gait: deferred    Skin: No significant bruising or lacerations. LAB DATA REVIEWED:    Recent Results (from the past 24 hour(s))   METABOLIC PANEL, COMPREHENSIVE    Collection Time: 11/12/18  7:18 PM   Result Value Ref Range    Sodium 122 (L) 136 - 145 mmol/L    Potassium 7.1 (HH) 3.5 - 5.1 mmol/L    Chloride 95 (L) 97 - 108 mmol/L    CO2 21 21 - 32 mmol/L    Anion gap 6 5 - 15 mmol/L    Glucose 88 65 - 100 mg/dL    BUN 23 (H) 6 - 20 MG/DL    Creatinine 1.08 0.70 - 1.30 MG/DL    BUN/Creatinine ratio 21 (H) 12 - 20      GFR est AA >60 >60 ml/min/1.73m2    GFR est non-AA >60 >60 ml/min/1.73m2    Calcium 8.3 (L) 8.5 - 10.1 MG/DL    Bilirubin, total 1.8 (H) 0.2 - 1.0 MG/DL    ALT (SGPT) 31 12 - 78 U/L    AST (SGOT) 58 (H) 15 - 37 U/L    Alk. phosphatase 169 (H) 45 - 117 U/L    Protein, total 6.4 6.4 - 8.2 g/dL    Albumin 1.9 (L) 3.5 - 5.0 g/dL    Globulin 4.5 (H) 2.0 - 4.0 g/dL    A-G Ratio 0.4 (L) 1.1 - 2.2     CBC WITH AUTOMATED DIFF    Collection Time: 11/12/18  7:18 PM   Result Value Ref Range    WBC 9.4 4.1 - 11.1 K/uL    RBC 4.53 4.10 - 5.70 M/uL    HGB 14.5 12.1 - 17.0 g/dL    HCT 41.8 36.6 - 50.3 %    MCV 92.3 80.0 - 99.0 FL    MCH 32.0 26.0 - 34.0 PG    MCHC 34.7 30.0 - 36.5 g/dL    RDW 13.7 11.5 - 14.5 %    PLATELET 817 921 - 473 K/uL    MPV 8.6 (L) 8.9 - 12.9 FL    NRBC 0.0 0  WBC    ABSOLUTE NRBC 0.00 0.00 - 0.01 K/uL    NEUTROPHILS 81 (H) 32 - 75 %    LYMPHOCYTES 5 (L) 12 - 49 %    MONOCYTES 13 5 - 13 %    EOSINOPHILS 1 0 - 7 %    BASOPHILS 0 0 - 1 %    IMMATURE GRANULOCYTES 0 0.0 - 0.5 %    ABS. NEUTROPHILS 7.6 1.8 - 8.0 K/UL    ABS. LYMPHOCYTES 0.5 (L) 0.8 - 3.5 K/UL    ABS. MONOCYTES 1.2 (H) 0.0 - 1.0 K/UL    ABS. EOSINOPHILS 0.1 0.0 - 0.4 K/UL    ABS. BASOPHILS 0.0 0.0 - 0.1 K/UL    ABS. IMM.  GRANS. 0.0 0.00 - 0.04 K/UL    DF MANUAL      PLATELET COMMENTS ADEQUATE PLATELETS      RBC COMMENTS NORMOCYTIC, NORMOCHROMIC     PROTHROMBIN TIME + INR    Collection Time: 11/12/18  7:18 PM   Result Value Ref Range    INR 1.7 (H) 0.9 - 1.1      Prothrombin time 16.4 (H) 9.0 - 11.1 sec   TROPONIN I    Collection Time: 11/12/18  7:18 PM   Result Value Ref Range    Troponin-I, Qt. <0.05 <0.05 ng/mL   BNP    Collection Time: 11/12/18  7:18 PM   Result Value Ref Range     (H) 0 - 100 pg/mL   SAMPLES BEING HELD    Collection Time: 11/12/18  7:18 PM   Result Value Ref Range    SAMPLES BEING HELD 1RED     COMMENT        Add-on orders for these samples will be processed based on acceptable specimen integrity and analyte stability, which may vary by analyte.    EKG, 12 LEAD, INITIAL    Collection Time: 11/12/18  8:14 PM   Result Value Ref Range    Ventricular Rate 69 BPM    Atrial Rate 69 BPM    P-R Interval 178 ms    QRS Duration 78 ms    Q-T Interval 400 ms    QTC Calculation (Bezet) 428 ms    Calculated P Axis 11 degrees    Calculated R Axis 5 degrees    Calculated T Axis -2 degrees    Diagnosis       Normal sinus rhythm  Low voltage QRS  Inferior infarct , age undetermined  Cannot rule out Anterior infarct , age undetermined  Abnormal ECG  When compared with ECG of 30-NOV-2012 14:57,  QRS voltage has decreased  Minimal criteria for Anterior infarct are now present  Inferior infarct is now present  T wave amplitude has decreased in Lateral leads     GLUCOSE, POC    Collection Time: 11/12/18 10:32 PM   Result Value Ref Range    Glucose (POC) 126 (H) 65 - 100 mg/dL    Performed by INA VERA(PCT)    POTASSIUM    Collection Time: 11/12/18 10:45 PM   Result Value Ref Range    Potassium 5.5 (H) 3.5 - 5.1 mmol/L   METABOLIC PANEL, BASIC    Collection Time: 11/13/18  2:02 AM   Result Value Ref Range    Sodium 126 (L) 136 - 145 mmol/L    Potassium 5.4 (H) 3.5 - 5.1 mmol/L    Chloride 98 97 - 108 mmol/L    CO2 21 21 - 32 mmol/L    Anion gap 7 5 - 15 mmol/L    Glucose 98 65 - 100 mg/dL    BUN 22 (H) 6 - 20 MG/DL    Creatinine 0.93 0.70 - 1.30 MG/DL BUN/Creatinine ratio 24 (H) 12 - 20      GFR est AA >60 >60 ml/min/1.73m2    GFR est non-AA >60 >60 ml/min/1.73m2    Calcium 7.8 (L) 8.5 - 10.1 MG/DL   OSMOLALITY, SERUM/PLASMA    Collection Time: 11/13/18  3:53 AM   Result Value Ref Range    Osmolality, serum/plasma 274 mOsm/kg H2O   AMMONIA    Collection Time: 11/13/18  3:53 AM   Result Value Ref Range    Ammonia 43 (H) <32 UMOL/L   TROPONIN I    Collection Time: 11/13/18  3:53 AM   Result Value Ref Range    Troponin-I, Qt. <0.05 <0.05 ng/mL   OSMOLALITY, UR    Collection Time: 11/13/18  4:04 AM   Result Value Ref Range    Osmolality,urine 927 MOSM/kg H2O   SODIUM, UR, RANDOM    Collection Time: 11/13/18  4:04 AM   Result Value Ref Range    Sodium,urine random 5 MMOL/L   METABOLIC PANEL, COMPREHENSIVE    Collection Time: 11/13/18  4:04 AM   Result Value Ref Range    Sodium 127 (L) 136 - 145 mmol/L    Potassium 5.6 (H) 3.5 - 5.1 mmol/L    Chloride 100 97 - 108 mmol/L    CO2 21 21 - 32 mmol/L    Anion gap 6 5 - 15 mmol/L    Glucose 106 (H) 65 - 100 mg/dL    BUN 21 (H) 6 - 20 MG/DL    Creatinine 0.89 0.70 - 1.30 MG/DL    BUN/Creatinine ratio 24 (H) 12 - 20      GFR est AA >60 >60 ml/min/1.73m2    GFR est non-AA >60 >60 ml/min/1.73m2    Calcium 8.1 (L) 8.5 - 10.1 MG/DL    Bilirubin, total 1.6 (H) 0.2 - 1.0 MG/DL    ALT (SGPT) 29 12 - 78 U/L    AST (SGOT) 50 (H) 15 - 37 U/L    Alk.  phosphatase 141 (H) 45 - 117 U/L    Protein, total 5.8 (L) 6.4 - 8.2 g/dL    Albumin 1.8 (L) 3.5 - 5.0 g/dL    Globulin 4.0 2.0 - 4.0 g/dL    A-G Ratio 0.5 (L) 1.1 - 2.2     CBC W/O DIFF    Collection Time: 11/13/18  4:04 AM   Result Value Ref Range    WBC 8.8 4.1 - 11.1 K/uL    RBC 4.12 4.10 - 5.70 M/uL    HGB 13.5 12.1 - 17.0 g/dL    HCT 38.7 36.6 - 50.3 %    MCV 93.9 80.0 - 99.0 FL    MCH 32.8 26.0 - 34.0 PG    MCHC 34.9 30.0 - 36.5 g/dL    RDW 14.0 11.5 - 14.5 %    PLATELET 068 235 - 410 K/uL    MPV 8.9 8.9 - 12.9 FL    NRBC 0.0 0  WBC    ABSOLUTE NRBC 0.00 0.00 - 0.01 K/uL HOME MEDS  Prior to Admission Medications   Prescriptions Last Dose Informant Patient Reported? Taking? OB-LV-CX-Fe-Min-Lycopen-Lutein (CENTRUM) 0.4-162-18 mg Tab   Yes No   Sig: Take  by mouth daily. carvedilol (COREG) 3.125 mg tablet   Yes No   Sig: Take  by mouth two (2) times a day. furosemide (LASIX) 40 mg tablet   Yes No   Sig: Take 40 mg by mouth daily. magnesium oxide (MAG-OX) 400 mg tablet  Self Yes No   Sig: Take 400 mg by mouth daily. Indications: hypomagnesemia   metOLazone (ZAROXOLYN) 5 mg tablet Not Taking at Unknown time Self Yes No   Sig: Take 5 mg by mouth daily. Indications: edema   potassium chloride (K-DUR, KLOR-CON) 20 mEq tablet   No No   Sig: Take 1 Tab by mouth two (2) times a day. spironolactone (ALDACTONE) 25 mg tablet Not Taking at Unknown time Self Yes No   Sig: Take 25 mg by mouth daily. Indications: Ascites      Facility-Administered Medications: None       CURRENT MEDS  Current Facility-Administered Medications   Medication Dose Route Frequency    carvedilol (COREG) tablet 3.125 mg  3.125 mg Oral BID    furosemide (LASIX) tablet 40 mg  40 mg Oral DAILY    magnesium oxide (MAG-OX) tablet 400 mg  400 mg Oral DAILY    metOLazone (ZAROXOLYN) tablet 5 mg  5 mg Oral DAILY    sodium chloride (NS) flush 5-10 mL  5-10 mL IntraVENous Q8H    sodium chloride (NS) flush 5-10 mL  5-10 mL IntraVENous Q8H    aspirin chewable tablet 81 mg  81 mg Oral DAILY       Stroke workup    MRI Brain  Atrophy and chronic white matter disease, with no acute intracranial process. Carotids:   Pending    TTE:   Pending    Stroke labs:  HgBA1c    Lab Results   Component Value Date/Time    Hemoglobin A1c 5.4 11/30/2012 02:15 PM     LDL   Lab Results   Component Value Date/Time    LDL, calculated 99.8 03/14/2018 05:15 PM       IMPRESSION/Plan:  Iban Ortega is a 77 y.o. male who presents with generalized weakness and 3 episodes of transient diplopia. Pt is much better today.  Last episode of diplopia 5 days ago. He was found to have hyponatremia, hyperkalemia and hyperammonemia. Electrolytes much better today. MRI of the brain is normal.     Suspect his neurological symptoms are from hyponatremia. Imaging showed no brainstem infarct. Symptomatically much better. Gentle correction of hyponatremia. Treatment of hyperkalemia and hyperammonemia as per primary team.    Call with questions. Thank you very much for this consultation.       Erin Gunn MD  Neurologist

## 2018-11-13 NOTE — ED NOTES
TRANSFER - OUT REPORT: 
 
Verbal report given to Zulma Ag RN(name) on Lito Yi  being transferred to tele(unit) for routine progression of care Report consisted of patients Situation, Background, Assessment and  
Recommendations(SBAR). Information from the following report(s) SBAR, Kardex, ED Summary and MAR was reviewed with the receiving nurse. Lines:  
Peripheral IV 11/13/18 Anterior;Right Antecubital (Active) Site Assessment Clean, dry, & intact 11/13/2018  3:00 AM  
Phlebitis Assessment 0 11/13/2018  3:00 AM  
Infiltration Assessment 0 11/13/2018  3:00 AM  
Dressing Status Clean, dry, & intact 11/13/2018  3:00 AM  
Dressing Type Tape;Transparent 11/13/2018  3:00 AM  
Hub Color/Line Status Pink;Capped 11/13/2018  3:00 AM  
  
 
Opportunity for questions and clarification was provided. Patient transported with: 
 transport

## 2018-11-13 NOTE — ED NOTES
Pt back to room from LORIE testing. Family member at bedside. Pt updated on plan of care. Given apple juice. All needs addressed. Will continue to monitor

## 2018-11-13 NOTE — ED TRIAGE NOTES
Care of patient assumed from triage. Patient presents with complaints of diplopia. Patient was hospitalized at [facility] for a high potassium and was treated with glucose, insulin, and kayexelate. Patient transferred to [facility] to be admitted to neuro floor.

## 2018-11-13 NOTE — ED PROVIDER NOTES
EMERGENCY DEPARTMENT HISTORY AND PHYSICAL EXAM 
 
Date: 11/13/2018 Patient Name: Godfrey Schneider History of Presenting Illness Chief Complaint Patient presents with  Double Vision Patient complaining of diplopia  Abnormal Lab Results Hyperkalemia History Provided By: Patient, EMS and Medical Records HPI: Godfrey Schneider is a 77 y.o. male, pmhx HTN, alcoholic cirrhosis, CHF, who presents via EMS as a transfer to [facility] to the ED c/o gradually worsening dizziness with secondary unstable gait x yesterday. Pt additionally reports x3 intermittent episodes of his eyes \"not tracking together\" x last week. Pt states the sxs would last about 5-10 minutes prior to subsiding. Pt was evaluated at [facility], where he was found to have elevated potassium, EKG without any acute hyperkalemia changes . Pt was treated with glucose, insulin, and Kayexalate, prior to transfer to [facility] ER for admission with Neurology consult. Of note, the pt has a chronic Abdominal drain and endorses taking lasix daily. Pt specifically denies any fever, congestion, cough, shortness of breath, chest pain, abdominal pain, nausea, vomiting, diarrhea, dysuria, or urinary frequency. PCP: Daksha HUNG MD 
 
PMHx: Significant for HTN, anxiety, depression, alcoholic cirrhosis, CHF 
PSHx: Significant for bilateral hip replacement, Orthopedic, hernia repair, colonoscopy Social Hx: -tobacco, -EtOH (former quit 3834), -Illicit Drugs There are no other complaints, changes, or physical findings at this time. Current Facility-Administered Medications Medication Dose Route Frequency Provider Last Rate Last Dose  carvedilol (COREG) tablet 3.125 mg  3.125 mg Oral BID Branden Kelly MD      
 furosemide (LASIX) tablet 40 mg  40 mg Oral DAILY Branden Kelly MD      
 magnesium oxide (MAG-OX) tablet 400 mg  400 mg Oral DAILY Branden Kelly MD      
 metOLazone (ZAROXOLYN) tablet 5 mg  5 mg Oral DAILY Branden Kelly MD  sodium chloride (NS) flush 5-10 mL  5-10 mL IntraVENous Q8H Toya Cervantes MD      
 sodium chloride (NS) flush 5-10 mL  5-10 mL IntraVENous PRN Toya Cervantes MD      
 ondansetron [facility]) injection 4 mg  4 mg IntraVENous Q6H PRN Toya Cervantes MD      
 bisacodyl (DULCOLAX) tablet 5 mg  5 mg Oral DAILY PRN Toya Cervantes MD      
 sodium chloride (NS) flush 5-10 mL  5-10 mL IntraVENous Ravi Anne [place]undel, MD      
 sodium chloride (NS) flush 5-10 mL  5-10 mL IntraVENous PRN Toya Cervantes MD      
 aspirin chewable tablet 81 mg  81 mg Oral DAILY Toya Cervantes MD      
 labetalol (NORMODYNE;TRANDATE) injection 5 mg  5 mg IntraVENous Q10MIN PRN Toya Cervantes MD      
 
Current Outpatient Medications Medication Sig Dispense Refill  spironolactone (ALDACTONE) 25 mg tablet Take 25 mg by mouth daily. Indications: Ascites  metOLazone (ZAROXOLYN) 5 mg tablet Take 5 mg by mouth daily. Indications: edema  magnesium oxide (MAG-OX) 400 mg tablet Take 400 mg by mouth daily. Indications: hypomagnesemia  potassium chloride (K-DUR, KLOR-CON) 20 mEq tablet Take 1 Tab by mouth two (2) times a day. 60 Tab 0  
 furosemide (LASIX) 40 mg tablet Take 40 mg by mouth daily.  carvedilol (COREG) 3.125 mg tablet Take  by mouth two (2) times a day.  PM-PE-MI-Fe-Min-Lycopen-Lutein (CENTRUM) 0.4-162-18 mg Tab Take  by mouth daily. Past History Past Medical History: 
Past Medical History:  
Diagnosis Date  Arrhythmia   
 intermittent tachycardia  Breast lump   
 bilateral 6-8 weeks  Congestive heart failure ([address]  Hypertension  Liver disease   
 alcoholic cirrhosis  Psychiatric disorder   
 anxiety and depression Past Surgical History: 
Past Surgical History:  
Procedure Laterality Date  HX COLONOSCOPY    
 HX HEENT  1980's  
 oral surgery  HX HERNIA REPAIR Right 2009  
 ingunial  
 HX ORTHOPAEDIC  12/7/2012 REMOVAL OF PROSTALAC IMPLANT, REIMPLANTATION LEFT TOTAL HIP ARTHROPLASTY  REVISE TOTAL HIP REPLACEMENT Left 2013  TOTAL HIP ARTHROPLASTY Bilateral   
 bilateral hip replacement Family History: 
Family History Problem Relation Age of Onset  Cancer Mother  Breast Cancer Mother  Heart Disease Father  Cancer Brother   
     lung Social History: 
Social History Tobacco Use  Smoking status: Former Smoker Last attempt to quit: 2013 Years since quittin.7  Smokeless tobacco: Never Used Substance Use Topics  Alcohol use: No  
  Comment: last  ETOH Brina Castellanos   Drug use: No  
  Comment: H/O in the past  
 
 
Allergies: Allergies Allergen Reactions  Darvocet A500 [Propoxyphene N-Acetaminophen] Other (comments) Extreme emotions  Librium [Chlordiazepoxide Hcl] Other (comments) Severe shaking and unable to control motor movements  Azithromycin Diarrhea Reduced Normal Whitney in GI TRACT Review of Systems Review of Systems Constitutional: Negative for chills and fever. HENT: Negative. Eyes: Negative. Respiratory: Negative for cough, chest tightness and shortness of breath. Cardiovascular: Negative for chest pain and leg swelling. Gastrointestinal: Negative for abdominal pain, diarrhea, nausea and vomiting. Endocrine: Negative. Genitourinary: Negative for difficulty urinating and dysuria. Musculoskeletal: Positive for gait problem. Negative for myalgias. Skin: Negative. Neurological: Positive for dizziness. Psychiatric/Behavioral: Negative. All other systems reviewed and are negative. Physical Exam  
Physical Exam 
 
 
Diagnostic Study Results Labs - Recent Results (from the past 12 hour(s)) METABOLIC PANEL, COMPREHENSIVE Collection Time: 18  7:18 PM  
Result Value Ref Range Sodium 122 (L) 136 - 145 mmol/L  Potassium 7.1 (HH) 3.5 - 5.1 mmol/L  
 Chloride 95 (L) 97 - 108 mmol/L  
 CO2 21 21 - 32 mmol/L Anion gap 6 5 - 15 mmol/L Glucose 88 65 - 100 mg/dL BUN 23 (H) 6 - 20 MG/DL Creatinine 1.08 0.70 - 1.30 MG/DL  
 BUN/Creatinine ratio 21 (H) 12 - 20 GFR est AA >60 >60 ml/min/1.73m2 GFR est non-AA >60 >60 ml/min/1.73m2 Calcium 8.3 (L) 8.5 - 10.1 MG/DL Bilirubin, total 1.8 (H) 0.2 - 1.0 MG/DL  
 ALT (SGPT) 31 12 - 78 U/L  
 AST (SGOT) 58 (H) 15 - 37 U/L Alk. phosphatase 169 (H) 45 - 117 U/L Protein, total 6.4 6.4 - 8.2 g/dL Albumin 1.9 (L) 3.5 - 5.0 g/dL Globulin 4.5 (H) 2.0 - 4.0 g/dL A-G Ratio 0.4 (L) 1.1 - 2.2    
CBC WITH AUTOMATED DIFF Collection Time: 11/12/18  7:18 PM  
Result Value Ref Range WBC 9.4 4.1 - 11.1 K/uL  
 RBC 4.53 4.10 - 5.70 M/uL  
 HGB 14.5 12.1 - 17.0 g/dL HCT 41.8 36.6 - 50.3 % MCV 92.3 80.0 - 99.0 FL  
 MCH 32.0 26.0 - 34.0 PG  
 MCHC 34.7 30.0 - 36.5 g/dL  
 RDW 13.7 11.5 - 14.5 % PLATELET 795 948 - 147 K/uL MPV 8.6 (L) 8.9 - 12.9 FL  
 NRBC 0.0 0  WBC ABSOLUTE NRBC 0.00 0.00 - 0.01 K/uL NEUTROPHILS 81 (H) 32 - 75 % LYMPHOCYTES 5 (L) 12 - 49 % MONOCYTES 13 5 - 13 % EOSINOPHILS 1 0 - 7 % BASOPHILS 0 0 - 1 % IMMATURE GRANULOCYTES 0 0.0 - 0.5 % ABS. NEUTROPHILS 7.6 1.8 - 8.0 K/UL  
 ABS. LYMPHOCYTES 0.5 (L) 0.8 - 3.5 K/UL  
 ABS. MONOCYTES 1.2 (H) 0.0 - 1.0 K/UL  
 ABS. EOSINOPHILS 0.1 0.0 - 0.4 K/UL  
 ABS. BASOPHILS 0.0 0.0 - 0.1 K/UL  
 ABS. IMM. GRANS. 0.0 0.00 - 0.04 K/UL  
 DF MANUAL PLATELET COMMENTS ADEQUATE PLATELETS    
 RBC COMMENTS NORMOCYTIC, NORMOCHROMIC PROTHROMBIN TIME + INR Collection Time: 11/12/18  7:18 PM  
Result Value Ref Range INR 1.7 (H) 0.9 - 1.1 Prothrombin time 16.4 (H) 9.0 - 11.1 sec TROPONIN I Collection Time: 11/12/18  7:18 PM  
Result Value Ref Range Troponin-I, Qt. <0.05 <0.05 ng/mL BNP Collection Time: 11/12/18  7:18 PM  
Result Value Ref Range  (H) 0 - 100 pg/mL SAMPLES BEING HELD Collection Time: 11/12/18  7:18 PM  
Result Value Ref Range SAMPLES BEING HELD 1RED COMMENT Add-on orders for these samples will be processed based on acceptable specimen integrity and analyte stability, which may vary by analyte. EKG, 12 LEAD, INITIAL Collection Time: 11/12/18  8:14 PM  
Result Value Ref Range Ventricular Rate 69 BPM  
 Atrial Rate 69 BPM  
 P-R Interval 178 ms QRS Duration 78 ms Q-T Interval 400 ms QTC Calculation (Bezet) 428 ms Calculated P Axis 11 degrees Calculated R Axis 5 degrees Calculated T Axis -2 degrees Diagnosis Normal sinus rhythm Low voltage QRS Inferior infarct , age undetermined Cannot rule out Anterior infarct , age undetermined Abnormal ECG When compared with ECG of 30-NOV-2012 14:57, 
QRS voltage has decreased Minimal criteria for Anterior infarct are now present Inferior infarct is now present T wave amplitude has decreased in Lateral leads GLUCOSE, POC Collection Time: 11/12/18 10:32 PM  
Result Value Ref Range Glucose (POC) 126 (H) 65 - 100 mg/dL Performed by INA VERA(PCT) POTASSIUM Collection Time: 11/12/18 10:45 PM  
Result Value Ref Range Potassium 5.5 (H) 3.5 - 5.1 mmol/L METABOLIC PANEL, BASIC Collection Time: 11/13/18  2:02 AM  
Result Value Ref Range Sodium 126 (L) 136 - 145 mmol/L Potassium 5.4 (H) 3.5 - 5.1 mmol/L Chloride 98 97 - 108 mmol/L  
 CO2 21 21 - 32 mmol/L Anion gap 7 5 - 15 mmol/L Glucose 98 65 - 100 mg/dL BUN 22 (H) 6 - 20 MG/DL Creatinine 0.93 0.70 - 1.30 MG/DL  
 BUN/Creatinine ratio 24 (H) 12 - 20 GFR est AA >60 >60 ml/min/1.73m2 GFR est non-AA >60 >60 ml/min/1.73m2 Calcium 7.8 (L) 8.5 - 10.1 MG/DL Radiologic Studies -  
MRI BRAIN WO CONT    (Results Pending) DUPLEX CAROTID BILATERAL    (Results Pending) CT Results  (Last 48 hours)  
          
 11/12/18 3439  CT HEAD WO CONT Final result Impression:  IMPRESSION: No acute intracranial process. Narrative:  INDICATION: Delirium/confusion. Altered level of consciousness. TECHNIQUE: 5 mm axial images were obtained from the skull base through the  
vertex. CT dose reduction was achieved through use of a standardized protocol  
tailored for this examination and automatic exposure control for dose  
modulation. FINDINGS: The ventricles and cortical sulci are appropriate in size and  
configuration. There is no evidence of intracranial hemorrhage, mass, mass  
effect, or acute infarct. No extra-axial fluid collections are seen. The  
visualized paranasal sinuses and mastoid air cells are clear. The orbital  
structures are unremarkable. No osseous abnormalities are seen. CXR Results  (Last 48 hours)  
          
 11/12/18 1921  XR CHEST SNGL V Final result Impression:  IMPRESSION: Mild bibasilar linear atelectasis or scarring. No evidence of  
pneumonia or pleural effusion. Narrative:  INDICATION: Shortness of breath COMPARISON: 3/15/2018 FINDINGS: AP portable imaging of the chest performed at 7:15 PM demonstrates a  
stable cardiomediastinal silhouette. There is mild bibasilar linear atelectasis  
or scarring, similar to the prior study. No new airspace disease or pleural  
effusion is evident. No significant osseous abnormalities are seen. Medical Decision Making I am the first provider for this patient. I reviewed the vital signs, available nursing notes, past medical history, past surgical history, family history and social history. Vital Signs-Reviewed the patient's vital signs. Patient Vitals for the past 12 hrs: 
 Pulse Resp SpO2  
11/13/18 0345 89 13 96 % 11/13/18 0330 96 15 97 % 11/13/18 0315 91 16 99 % 11/13/18 0300 93 24 99 % 11/13/18 0245 85 16 98 % 11/13/18 0230 92 16 100 % 11/13/18 0215 89 15 100 % Pulse Oximetry Analysis - 96% on RA 
 
 Cardiac Monitor:  
Rate: 89 bpm 
Rhythm: Normal Sinus Rhythm Records Reviewed: Nursing Notes, Old Medical Records, Previous electrocardiograms, Ambulance Run Sheet, Previous Radiology Studies and Previous Laboratory Studies Provider Notes (Medical Decision Making): DDX: 
Hyperkalemia,  
 
Plan: 
Repeat labs, admit Impression: 
hyperkalemia ED Course:  
Initial assessment performed. The patients presenting problems have been discussed, and they are in agreement with the care plan formulated and outlined with them. I have encouraged them to ask questions as they arise throughout their visit. I reviewed our electronic medical record system for any past medical records that were available that may contribute to the patients current condition, the nursing notes and and vital signs from today's visit Nursing notes will be reviewed as they become available in realtime while the pt has been in the ED. Jayshree Hoyos MD 
 
I personally reviewed pt's imaging. Official read by radiology listed above. Jayshree Hoyos MD 
  
 
CONSULT NOTE:  
3:35 Jovita Solorio MD spoke with Dr. Deandre Mccrary, Specialty: Hospitalist 
Discussed pt's hx, disposition, and available diagnostic and imaging results. Reviewed care plans. Consultant will evaluate pt for admission. Written by Jazmine Birch, ED Scribe, as dictated by Jayshree Hoyos MD. Critical Care Time:  
 
none PLAN: 
1. Admission to the hospital  
 
Disposition: 
 
Admit Note: 
3:35 AM 
Pt is being admitted by Dr. Deandre Mccrary. The results of their tests and reason(s) for their admission have been discussed with pt and/or available family. They convey agreement and understanding for the need to be admitted and for admission diagnosis. Diagnosis Clinical Impression: 1. Double vision 2. Acute hyperkalemia Attestations:  
 
This note is prepared by Jazmine Birch, acting as Scribe for Jayshree Hoyos MD 
 
 Judith Robledo MD : The scribe's documentation has been prepared under my direction and personally reviewed by me in its entirety. I confirm that the note above accurately reflects all work, treatment, procedures, and medical decision making performed by me. This note will not be viewable in [address]

## 2018-11-13 NOTE — PROCEDURES
[facility]  *** FINAL REPORT ***    Name: Bob Bryson  MRN: [id number]    Inpatient  : 1950  HIS Order #: [id number]  [address] [facility] Visit #: [id number]  Date: 2018    TYPE OF TEST: Cerebrovascular Duplex    REASON FOR TEST  Transient ischemic attacks    Right Carotid:-             Proximal               Mid                 Distal  cm/s  Systolic  Diastolic  Systolic  Diastolic  Systolic  Diastolic  CCA:     15.1      18.0                            85.0      19.0  Bulb:  ECA:     56.0       8.0  ICA:     65.0      14.0       69.0      19.0       73.0      25.0  ICA/CCA:  0.8       0.7    ICA Stenosis: <50%    Right Vertebral:-  Finding: Antegrade  Sys:       32.0  Kaylyn:        8.0    Right Subclavian: Normal    Left Carotid:-            Proximal                Mid                 Distal  cm/s  Systolic  Diastolic  Systolic  Diastolic  Systolic  Diastolic  CCA:     09.5      13.0                            82.0      18.0  Bulb:  ECA:     99.0      13.0  ICA:     49.0      17.0       67.0      25.0       66.0      23.0  ICA/CCA:  0.6       0.9    ICA Stenosis: <50%    Left Vertebral:-  Finding: Antegrade  Sys:       47.0  Kaylyn:       13.0    Left Subclavian: Normal    INTERPRETATION/FINDINGS  PROCEDURE:  Carotid Duplex Examination using B-mode, color and  spectral Doppler of the extracranial cerebrovascular arteries. 1. Bilateral <50% stenosis of the internal carotid arteries. 2. No significant stenosis in the external carotid arteries  bilaterally. 3. Antegrade flow in both vertebral arteries. 4. Normal flow in both subclavian arteries. Plaque Morphology:  1. Homogeneous plaque in the bulb and right ICA (Minimal). 2. Calcific plaque in the bulb and left ICA. ADDITIONAL COMMENTS    I have personally reviewed the data relevant to the interpretation of  this  study. TECHNOLOGIST: Jerson Sena  Signed: 2018 11:06:54 AM    PHYSICIAN: Ayah Sanders MD  Signed: 11/13/2018 1:23:46 PM

## 2018-11-13 NOTE — PROGRESS NOTES
* No surgery found * 
* No surgery found * Bedside shift change report given to Alicia Poon (oncoming nurse) by Jannet Vasquez (offgoing nurse). Report included the following information SBAR, Kardex, Intake/Output, MAR and Recent Results. Zone Phone:   [phone] Significant changes during shift:  None, new admit held coreg due to BP MD aware Patient Information Carmelina Ponce 
77 y.o. 
11/13/2018  1:31 AM by Junior Crowder MD. Carmelina Ponce was admitted from Home 
 
Problem List 
 
Patient Active Problem List  
 Diagnosis Date Noted  Hyperkalemia 11/13/2018  Bilateral carotid artery stenosis 11/13/2018  Dizziness and giddiness 11/13/2018  Ascites due to chronic alcoholic hepatitis 42/24/6387  Colon AV malformation 10/03/2014  Diverticulosis of colon 10/03/2014  Colon polyps 10/03/2014  S/P inguinal hernia repair 08/04/2014  Cirrhosis, alcoholic ([address] 13/30/0176  S/P hip replacement 03/07/2013 Past Medical History:  
Diagnosis Date  Arrhythmia   
 intermittent tachycardia  Breast lump   
 bilateral 6-8 weeks  Congestive heart failure ([address]  Hypertension  Liver disease   
 alcoholic cirrhosis  Psychiatric disorder   
 anxiety and depression Core Measures: CVA: Yes Yes Activity Status: OOB to Chair No 
Ambulated this shift Yes Bed Rest No 
 
DVT prophylaxis: DVT prophylaxis Med- No 
DVT prophylaxis SCD or NEMESIO- Yes Wounds: (If Applicable) Wounds- No 
 
Patient Safety: 
 
Falls Score Total Score: 2 Safety Level_______ Bed Alarm On? No 
Sitter? No 
 
Plan for upcoming shift: safety, electrolyte Discharge Plan: No  
 
Active Consults: 
IP CONSULT TO HOSPITALIST 
IP CONSULT TO NEUROLOGY 
IP CONSULT TO NEPHROLOGY

## 2018-11-13 NOTE — ED NOTES
Pt has a cane that he uses at times when feeling week. Potassium running high has happened 3 times or more per pt but double vision is new this time. Pt reports it has been low at times also.

## 2018-11-13 NOTE — ED NOTES
Bedside shift change report given to Wendi Buckley (oncoming nurse) by Olga Sood (offgoing nurse). Report included the following information SBAR, Kardex, ED Summary and MAR. Pt currently in MRI

## 2018-11-14 LAB
ALBUMIN SERPL-MCNC: 1.6 G/DL (ref 3.5–5)
ALBUMIN/GLOB SERPL: 0.4 {RATIO} (ref 1.1–2.2)
ALP SERPL-CCNC: 131 U/L (ref 45–117)
ALT SERPL-CCNC: 27 U/L (ref 12–78)
AMMONIA PLAS-SCNC: 37 UMOL/L
ANION GAP SERPL CALC-SCNC: 6 MMOL/L (ref 5–15)
AST SERPL-CCNC: 50 U/L (ref 15–37)
BILIRUB SERPL-MCNC: 1.2 MG/DL (ref 0.2–1)
BUN SERPL-MCNC: 21 MG/DL (ref 6–20)
BUN/CREAT SERPL: 26 (ref 12–20)
CALCIUM SERPL-MCNC: 7.3 MG/DL (ref 8.5–10.1)
CHLORIDE SERPL-SCNC: 98 MMOL/L (ref 97–108)
CHOLEST SERPL-MCNC: 113 MG/DL
CO2 SERPL-SCNC: 21 MMOL/L (ref 21–32)
CORTIS AM PEAK SERPL-MCNC: 5.3 UG/DL (ref 4.3–22.45)
CREAT SERPL-MCNC: 0.8 MG/DL (ref 0.7–1.3)
ERYTHROCYTE [DISTWIDTH] IN BLOOD BY AUTOMATED COUNT: 14 % (ref 11.5–14.5)
EST. AVERAGE GLUCOSE BLD GHB EST-MCNC: 114 MG/DL
GLOBULIN SER CALC-MCNC: 4 G/DL (ref 2–4)
GLUCOSE SERPL-MCNC: 85 MG/DL (ref 65–100)
HBA1C MFR BLD: 5.6 % (ref 4.2–6.3)
HCT VFR BLD AUTO: 37.4 % (ref 36.6–50.3)
HDLC SERPL-MCNC: 43 MG/DL
HDLC SERPL: 2.6 {RATIO} (ref 0–5)
HGB BLD-MCNC: 12.9 G/DL (ref 12.1–17)
LDLC SERPL CALC-MCNC: 59.2 MG/DL (ref 0–100)
LIPID PROFILE,FLP: NORMAL
MCH RBC QN AUTO: 32.7 PG (ref 26–34)
MCHC RBC AUTO-ENTMCNC: 34.5 G/DL (ref 30–36.5)
MCV RBC AUTO: 94.7 FL (ref 80–99)
NRBC # BLD: 0 K/UL (ref 0–0.01)
NRBC BLD-RTO: 0 PER 100 WBC
OSMOLALITY SERPL: 270 MOSM/KG H2O
OSMOLALITY UR: 694 MOSM/KG H2O
PHOSPHATE SERPL-MCNC: 3.2 MG/DL (ref 2.6–4.7)
PLATELET # BLD AUTO: 140 K/UL (ref 150–400)
PMV BLD AUTO: 9.2 FL (ref 8.9–12.9)
POTASSIUM SERPL-SCNC: 5.2 MMOL/L (ref 3.5–5.1)
PROT SERPL-MCNC: 5.6 G/DL (ref 6.4–8.2)
RBC # BLD AUTO: 3.95 M/UL (ref 4.1–5.7)
SODIUM SERPL-SCNC: 125 MMOL/L (ref 136–145)
SODIUM UR-SCNC: <5 MMOL/L
TRIGL SERPL-MCNC: 54 MG/DL (ref ?–150)
TSH SERPL DL<=0.05 MIU/L-ACNC: 2.19 UIU/ML (ref 0.36–3.74)
URATE SERPL-MCNC: 4.2 MG/DL (ref 3.5–7.2)
VLDLC SERPL CALC-MCNC: 10.8 MG/DL
WBC # BLD AUTO: 7 K/UL (ref 4.1–11.1)

## 2018-11-14 PROCEDURE — 74011250636 HC RX REV CODE- 250/636: Performed by: INTERNAL MEDICINE

## 2018-11-14 PROCEDURE — 97535 SELF CARE MNGMENT TRAINING: CPT | Performed by: OCCUPATIONAL THERAPIST

## 2018-11-14 PROCEDURE — 83036 HEMOGLOBIN GLYCOSYLATED A1C: CPT

## 2018-11-14 PROCEDURE — 82533 TOTAL CORTISOL: CPT

## 2018-11-14 PROCEDURE — 85027 COMPLETE CBC AUTOMATED: CPT

## 2018-11-14 PROCEDURE — 97165 OT EVAL LOW COMPLEX 30 MIN: CPT | Performed by: OCCUPATIONAL THERAPIST

## 2018-11-14 PROCEDURE — 84550 ASSAY OF BLOOD/URIC ACID: CPT

## 2018-11-14 PROCEDURE — 93306 TTE W/DOPPLER COMPLETE: CPT

## 2018-11-14 PROCEDURE — 84100 ASSAY OF PHOSPHORUS: CPT

## 2018-11-14 PROCEDURE — 83930 ASSAY OF BLOOD OSMOLALITY: CPT

## 2018-11-14 PROCEDURE — 83935 ASSAY OF URINE OSMOLALITY: CPT

## 2018-11-14 PROCEDURE — 80061 LIPID PANEL: CPT

## 2018-11-14 PROCEDURE — G8987 SELF CARE CURRENT STATUS: HCPCS | Performed by: OCCUPATIONAL THERAPIST

## 2018-11-14 PROCEDURE — G8989 SELF CARE D/C STATUS: HCPCS | Performed by: OCCUPATIONAL THERAPIST

## 2018-11-14 PROCEDURE — 84443 ASSAY THYROID STIM HORMONE: CPT

## 2018-11-14 PROCEDURE — G8988 SELF CARE GOAL STATUS: HCPCS | Performed by: OCCUPATIONAL THERAPIST

## 2018-11-14 PROCEDURE — 74011250637 HC RX REV CODE- 250/637: Performed by: INTERNAL MEDICINE

## 2018-11-14 PROCEDURE — 84300 ASSAY OF URINE SODIUM: CPT

## 2018-11-14 PROCEDURE — 99218 HC RM OBSERVATION: CPT

## 2018-11-14 PROCEDURE — 94760 N-INVAS EAR/PLS OXIMETRY 1: CPT

## 2018-11-14 PROCEDURE — 36415 COLL VENOUS BLD VENIPUNCTURE: CPT

## 2018-11-14 PROCEDURE — 82140 ASSAY OF AMMONIA: CPT

## 2018-11-14 PROCEDURE — 80053 COMPREHEN METABOLIC PANEL: CPT

## 2018-11-14 RX ORDER — SODIUM CHLORIDE 1 G/1
1 TABLET ORAL 3 TIMES DAILY
Status: DISCONTINUED | OUTPATIENT
Start: 2018-11-14 | End: 2018-11-14 | Stop reason: SDUPTHER

## 2018-11-14 RX ORDER — SODIUM CHLORIDE 1 G/1
1 TABLET ORAL ONCE
Status: DISPENSED | OUTPATIENT
Start: 2018-11-14 | End: 2018-11-15

## 2018-11-14 RX ORDER — SODIUM CHLORIDE TAB 1 GM 1 G
1 TAB MISCELLANEOUS ONCE
Status: COMPLETED | OUTPATIENT
Start: 2018-11-14 | End: 2018-11-14

## 2018-11-14 RX ORDER — SODIUM POLYSTYRENE SULFONATE 15 G/60ML
45 SUSPENSION ORAL; RECTAL
Status: COMPLETED | OUTPATIENT
Start: 2018-11-14 | End: 2018-11-14

## 2018-11-14 RX ORDER — SODIUM CHLORIDE TAB 1 GM 1 G
1 TAB MISCELLANEOUS 3 TIMES DAILY
Status: DISCONTINUED | OUTPATIENT
Start: 2018-11-14 | End: 2018-11-16 | Stop reason: HOSPADM

## 2018-11-14 RX ADMIN — Medication 10 ML: at 23:30

## 2018-11-14 RX ADMIN — Medication 10 ML: at 03:51

## 2018-11-14 RX ADMIN — CARVEDILOL 3.12 MG: 3.12 TABLET, FILM COATED ORAL at 09:33

## 2018-11-14 RX ADMIN — ASPIRIN 81 MG 81 MG: 81 TABLET ORAL at 09:33

## 2018-11-14 RX ADMIN — Medication 400 MG: at 09:33

## 2018-11-14 RX ADMIN — SODIUM CHLORIDE 75 ML/HR: 900 INJECTION, SOLUTION INTRAVENOUS at 06:41

## 2018-11-14 RX ADMIN — Medication 10 ML: at 14:54

## 2018-11-14 RX ADMIN — SODIUM CHLORIDE TAB 1 GM 1 G: 1 TAB at 23:28

## 2018-11-14 RX ADMIN — SODIUM POLYSTYRENE SULFONATE 45 G: 15 SUSPENSION ORAL; RECTAL at 18:36

## 2018-11-14 RX ADMIN — SODIUM CHLORIDE TAB 1 GM 1 G: 1 TAB at 20:31

## 2018-11-14 NOTE — PROGRESS NOTES
* No surgery found * 
* No surgery found * Bedside shift change report given to [address] [place] [address] (oncoming nurse) by Jerry Horta RN (offgoing nurse). Report included the following information SBAR, Kardex, Intake/Output, MAR and Recent Results. Zone Phone:   [phone] Significant changes during shift:  None Patient Information Dorothea Neri 
77 y.o. 
11/13/2018  1:31 AM by Elina Malone MD. Dorothea Neri was admitted from Home 
 
Problem List 
 
Patient Active Problem List  
 Diagnosis Date Noted  Hyperkalemia 11/13/2018  Bilateral carotid artery stenosis 11/13/2018  Dizziness and giddiness 11/13/2018  Ascites due to chronic alcoholic hepatitis 33/36/1963  Colon AV malformation 10/03/2014  Diverticulosis of colon 10/03/2014  Colon polyps 10/03/2014  S/P inguinal hernia repair 08/04/2014  [address]rhosis, alcoholic ([facility] [address] 54/98/8461  S/P hip replacement 03/07/2013 Past Medical History:  
Diagnosis Date  Arrhythmia   
 intermittent tachycardia  Breast lump   
 bilateral 6-8 weeks  Congestive heart failure ([facility] [address] [facility]  Hypertension  Liver disease   
 alcoholic cirrhosis  Psychiatric disorder   
 anxiety and depression Core Measures: CVA: Yes Yes Activity Status: OOB to Chair No 
Ambulated this shift Yes Bed Rest No 
 
DVT prophylaxis: DVT prophylaxis Med- No 
DVT prophylaxis SCD or NEMESIO- Yes Wounds: (If Applicable) Wounds- No 
 
Patient Safety: 
 
Falls Score Total Score: 2 Safety Level_______ Bed Alarm On? No 
Sitter? No 
 
Plan for upcoming shift: safety, electrolyte Discharge Plan: No  
 
Active Consults: 
IP CONSULT TO HOSPITALIST 
IP CONSULT TO NEUROLOGY 
IP CONSULT TO NEPHROLOGY

## 2018-11-14 NOTE — PROGRESS NOTES
NEUROLOGY NOTE Chief Complaint Patient presents with  Double Vision Patient complaining of diplopia  Abnormal Lab Results Hyperkalemia SUBJECTIVE: 
No recurrence of symptoms No diplopia HPI Stiven Zuniga is a 77 y.o. male who presents to the hospital because of increasing weakness, ataxia and diplopia. He states that he has been having generalized weakness and it was quite significant yesterday and hence he came to [facility]. He has had 3 episodes of diplopia lasting for a minute and then resolved. Last episode was on Thursday. During these episodes, there was no focal weakness, sensory problems or any problems with speech. He has been tx to [facility]. Mri of the brain is normal. He was also found to be he hyperkalemic, hyponatremia and hyperammonemia. ROS A ten system review of constitutional, cardiovascular, respiratory, musculoskeletal, endocrine, skin, SHEENT, genitourinary, psychiatric and neurologic systems was obtained and is unremarkable except as stated in HPI  
 
PMH Past Medical History:  
Diagnosis Date  Arrhythmia   
 intermittent tachycardia  Breast lump   
 bilateral 6-8 weeks  Congestive heart failure ([address]  Hypertension  Liver disease   
 alcoholic cirrhosis  Psychiatric disorder   
 anxiety and depression [facility] Family History Problem Relation Age of Onset  Cancer Mother  Breast Cancer Mother  Heart Disease Father  Cancer Brother   
     lung [address] Social History Socioeconomic History  Marital status:  Spouse name: Not on file  Number of children: Not on file  Years of education: Not on file  Highest education level: Not on file Social Needs  Financial resource strain: Not on file  Food insecurity - worry: Not on file  Food insecurity - inability: Not on file  Transportation needs - medical: Not on file  Transportation needs - non-medical: Not on file Occupational History  Not on file Tobacco Use  Smoking status: Former Smoker Last attempt to quit: 2013 Years since quittin.7  Smokeless tobacco: Never Used Substance and Sexual Activity  Alcohol use: No  
  Comment: last  ETOH Brina Castellanos   Drug use: No  
  Comment: H/O in the past  
 Sexual activity: Not on file Other Topics Concern  Not on file Social History Narrative  Not on file ALLERGIES Allergies Allergen Reactions  Darvocet A500 [Propoxyphene N-Acetaminophen] Other (comments) Extreme emotions  Librium [Chlordiazepoxide Hcl] Other (comments) Severe shaking and unable to control motor movements  Azithromycin Diarrhea Reduced Normal Whitney in GI TRACT PHYSICAL EXAMINATION:  
Patient Vitals for the past 24 hrs: 
 Temp Pulse Resp BP SpO2  
18 1101 97.6 °F (36.4 °C) 99 18 92/62 96 % 18 0742 98 °F (36.7 °C) (!) 103 18 103/67 99 % 18 0349 97.9 °F (36.6 °C) 83 16 105/64 99 % 18 0000    92/55   
18 2324 98.1 °F (36.7 °C) 78 18 (!) 81/52 97 % 18 1928 98.1 °F (36.7 °C) 81 18 92/53 99 % 18 1805    90/60   
18 1718 98.2 °F (36.8 °C) 89 18 101/70 100 % 18 1645  77 16  100 % 18 1641 97.9 °F (36.6 °C) 77 17 92/57 99 % 18 1640  77 17  99 % 18 1634  82 15  99 % 18 1615  79 17 93/63 100 % 18 1400  83 12 99/64 98 % General:  
General appearance: Pt is in no acute distress Distal pulses are preserved Neurological Examination:  
Mental Status:  AAO x3. Speech is fluent. Follows commands, has normal fund of knowledge, attention, short term recall, comprehension and insight. Cranial Nerves: Visual fields are full. PERRL, Extraocular movements are full. Facial sensation intact. Facial movement intact. Hearing intact to conversation. Palate elevates symmetrically. Shoulder shrug symmetric. Tongue midline. Motor: Strength is 5/5 in all 4 ext. Normal tone. No atrophy. Sensation: Normal to light touch Coordination/Cerebellar: Intact to finger-nose-finger Gait: deferred Skin: No significant bruising or lacerations. LAB DATA REVIEWED:   
Recent Results (from the past 24 hour(s)) TROPONIN I Collection Time: 11/13/18  4:36 PM  
Result Value Ref Range Troponin-I, Qt. <0.05 <0.05 ng/mL METABOLIC PANEL, BASIC Collection Time: 11/13/18  4:36 PM  
Result Value Ref Range Sodium 127 (L) 136 - 145 mmol/L Potassium 4.9 3.5 - 5.1 mmol/L Chloride 98 97 - 108 mmol/L  
 CO2 22 21 - 32 mmol/L Anion gap 7 5 - 15 mmol/L Glucose 102 (H) 65 - 100 mg/dL BUN 22 (H) 6 - 20 MG/DL Creatinine 0.88 0.70 - 1.30 MG/DL  
 BUN/Creatinine ratio 25 (H) 12 - 20 GFR est AA >60 >60 ml/min/1.73m2 GFR est non-AA >60 >60 ml/min/1.73m2 Calcium 7.6 (L) 8.5 - 79.3 MG/DL  
METABOLIC PANEL, COMPREHENSIVE Collection Time: 11/14/18  3:53 AM  
Result Value Ref Range Sodium 125 (L) 136 - 145 mmol/L Potassium 5.2 (H) 3.5 - 5.1 mmol/L Chloride 98 97 - 108 mmol/L  
 CO2 21 21 - 32 mmol/L Anion gap 6 5 - 15 mmol/L Glucose 85 65 - 100 mg/dL BUN 21 (H) 6 - 20 MG/DL Creatinine 0.80 0.70 - 1.30 MG/DL  
 BUN/Creatinine ratio 26 (H) 12 - 20 GFR est AA >60 >60 ml/min/1.73m2 GFR est non-AA >60 >60 ml/min/1.73m2 Calcium 7.3 (L) 8.5 - 10.1 MG/DL Bilirubin, total 1.2 (H) 0.2 - 1.0 MG/DL  
 ALT (SGPT) 27 12 - 78 U/L  
 AST (SGOT) 50 (H) 15 - 37 U/L Alk. phosphatase 131 (H) 45 - 117 U/L Protein, total 5.6 (L) 6.4 - 8.2 g/dL Albumin 1.6 (L) 3.5 - 5.0 g/dL Globulin 4.0 2.0 - 4.0 g/dL A-G Ratio 0.4 (L) 1.1 - 2.2    
CBC W/O DIFF Collection Time: 11/14/18  3:53 AM  
Result Value Ref Range WBC 7.0 4.1 - 11.1 K/uL  
 RBC 3.95 (L) 4.10 - 5.70 M/uL  
 HGB 12.9 12.1 - 17.0 g/dL HCT 37.4 36.6 - 50.3 %  MCV 94.7 80.0 - 99.0 FL  
 MCH 32.7 26.0 - 34.0 PG  
 MCHC 34.5 30.0 - 36.5 g/dL  
 RDW 14.0 11.5 - 14.5 % PLATELET 633 (L) 072 - 400 K/uL MPV 9.2 8.9 - 12.9 FL  
 NRBC 0.0 0  WBC ABSOLUTE NRBC 0.00 0.00 - 0.01 K/uL LIPID PANEL Collection Time: 11/14/18  3:53 AM  
Result Value Ref Range LIPID PROFILE Cholesterol, total 113 <200 MG/DL Triglyceride 54 <150 MG/DL  
 HDL Cholesterol 43 MG/DL  
 LDL, calculated 59.2 0 - 100 MG/DL VLDL, calculated 10.8 MG/DL  
 CHOL/HDL Ratio 2.6 0 - 5.0 HEMOGLOBIN A1C WITH EAG Collection Time: 11/14/18  3:53 AM  
Result Value Ref Range Hemoglobin A1c 5.6 4.2 - 6.3 % Est. average glucose 114 mg/dL CORTISOL, AM  
 Collection Time: 11/14/18  3:53 AM  
Result Value Ref Range Cortisol, a.m. 5.3 4.30 - 22.45 ug/dL OSMOLALITY, SERUM/PLASMA Collection Time: 11/14/18  3:53 AM  
Result Value Ref Range Osmolality, serum/plasma 270 mOsm/kg H2O  
OSMOLALITY, UR Collection Time: 11/14/18  3:53 AM  
Result Value Ref Range Osmolality,urine 694 MOSM/kg H2O  
AMMONIA Collection Time: 11/14/18  3:53 AM  
Result Value Ref Range Ammonia 37 (H) <32 UMOL/L  
PHOSPHORUS Collection Time: 11/14/18  3:53 AM  
Result Value Ref Range Phosphorus 3.2 2.6 - 4.7 MG/DL  
TSH 3RD GENERATION Collection Time: 11/14/18  3:53 AM  
Result Value Ref Range TSH 2.19 0.36 - 3.74 uIU/mL URIC ACID Collection Time: 11/14/18  3:53 AM  
Result Value Ref Range Uric acid 4.2 3.5 - 7.2 MG/DL  
  
 
HOME MEDS Prior to Admission Medications Prescriptions Last Dose Informant Patient Reported? Taking? QQ-EA-IX-Fe-Min-Lycopen-Lutein (CENTRUM) 0.4-162-18 mg Tab   Yes No  
Sig: Take  by mouth daily. carvedilol (COREG) 3.125 mg tablet   Yes No  
Sig: Take  by mouth two (2) times a day. furosemide (LASIX) 40 mg tablet   Yes No  
Sig: Take 40 mg by mouth daily. magnesium oxide (MAG-OX) 400 mg tablet  Self Yes No  
Sig: Take 400 mg by mouth daily. Indications: hypomagnesemia metOLazone (ZAROXOLYN) 5 mg tablet Not Taking at Unknown time Self Yes No  
Sig: Take 5 mg by mouth daily. Indications: edema  
potassium chloride (K-DUR, KLOR-CON) 20 mEq tablet   No No  
Sig: Take 1 Tab by mouth two (2) times a day. spironolactone (ALDACTONE) 25 mg tablet Not Taking at Unknown time Self Yes No  
Sig: Take 25 mg by mouth daily. Indications: Ascites Facility-Administered Medications: None CURRENT MEDS Current Facility-Administered Medications Medication Dose Route Frequency  carvedilol (COREG) tablet 3.125 mg  3.125 mg Oral BID  magnesium oxide (MAG-OX) tablet 400 mg  400 mg Oral DAILY  sodium chloride (NS) flush 5-10 mL  5-10 mL IntraVENous Q8H  
 sodium chloride (NS) flush 5-10 mL  5-10 mL IntraVENous Q8H  
 aspirin chewable tablet 81 mg  81 mg Oral DAILY Stroke workup MRI Brain Atrophy and chronic white matter disease, with no acute intracranial process. Carotids:  
1. Bilateral <50% stenosis of the internal carotid arteries. 2. No significant stenosis in the external carotid arteries 
bilaterally. TTE:  
Pending Stroke labs: 
HgBA1c Lab Results Component Value Date/Time Hemoglobin A1c 5.6 11/14/2018 03:53 AM  
 
LDL Lab Results Component Value Date/Time LDL, calculated 59.2 11/14/2018 03:53 AM  
 
 
IMPRESSION/Plan: 
Belkys Dasilva is a 77 y.o. male who presents with generalized weakness and 3 episodes of transient diplopia. Pt is much better today. Last episode of diplopia 5 days ago. He was found to have hyponatremia, hyperkalemia and hyperammonemia. Electrolytes much better today. MRI of the brain is normal. Carotid US is nl. No recurrence in hospital. 
 
Suspect his neurological symptoms are from hyponatremia. Treatment of hyperkalemia and hyperammonemia as per primary team. 
 
Call with questions.   
 
 
 
Moira Ganser, MD 
Neurologist

## 2018-11-14 NOTE — PROGRESS NOTES
Care Management: 
 
Reason for Admission:   Hyperkalemia Hx blurry vision, HTN and liver cirrhosis He is active with his PCP. RRAT Score:     22 Resources/supports as identified by patient/family:   Family, friends, PCP Top Challenges facing patient (as identified by patient/family and CM): Finances/Medication cost?    No concerns at this time. He uses [facility] in [place]. Transportation? Family or self. Support system or lack thereof? Family / friends Living arrangements? Lives alone and has his dog. Independent with ADL's. Self-care/ADL's/Cognition? Independent, alert and oriented. Current Advanced Directive/Advance Care Plan:   
                      Full Code Plan for utilizing home health:    He is agreeable to [facility]. FOC for [facility] placed on chart and referral sent to [facility] via [address]. Likelihood of readmission: low Transition of Care Plan:  Home with family assist and [facility]. Care Management Interventions PCP Verified by CM: Yes Mode of Transport at Discharge: Other (see comment)(family) Transition of Care Consult (CM Consult): Home Health [address]: Yes Physical Therapy Consult: Yes Occupational Therapy Consult: Yes Current Support Network: Lives Alone(Lives alone in own home and independent and active. He drives. He has a cane and a walker. [facility] ) Confirm Follow Up Transport: Family(Family or self) Plan discussed with Pt/Family/Caregiver: Yes Discharge Location Discharge Placement: Home with home health His physical address is [address] [place] [address] [place] [id number] [address] [facility] [address]

## 2018-11-14 NOTE — PROGRESS NOTES
Nephrology Progress Note Jim Finley  
 
[email or web address]                  Phone - [phone] Patient: Kiya Ly YOB: 1950     Admit Date: 11/13/2018 Date- 11/14/2018 CC: Follow up for  Hyperkalemia and hyponatremia Subjective: Interval History:  
-  k increased to 5.2 Na dropped to 125 Urine osmo high Urine na remained low even after ivf No c/o sob, chest pain, No c/o nausea or vomiting No c/o  fever. ROS:- as above Assessment: 1. Hyperkalemia due to high potassium diet, potassium supplementation and Aldactone use. 2.  Hyponatremia, likely due to dehydration, although cirrhosis could be contributing to his hyponatremia. His urine out flow is 927 and urine sodium is 5 - urine osmo improved with ivf but NA < 5 
3. History of alcoholic cirrhosis. 4.  History of ascites requiring ascitic drain placement in 08/2018. 
5.  History of hypertension. ·  
 
 Plan:  
· Kayexalate po now · Check bmp now · Stop  Nacl for now - his na dropped . If repeat na level is > 127 we will resume ivf · Fluid restriction 1200 ml per day · Start salt tabs · Can't give samsca with cirrhosis · May need hypertonic saline if na continue to drop · Very difficult situation. Physical Exam:  
GEN: NAD NECK- Supple, no thyromegaly RESP: Clear b/l, no wheezing, No accessory muscle use CVS: RRR,S1,S2 SKIN: No Rash, ABDO: soft , non tender, ascitic drain cath + EXT:trace Edema NEURO: non focal, normal speech Care Plan discussed with: patient and nurse Objective:  
Patient Vitals for the past 24 hrs: 
 Temp Pulse Resp BP SpO2  
11/14/18 0742 98 °F (36.7 °C) (!) 103 18 103/67 99 % 11/14/18 0349 97.9 °F (36.6 °C) 83 16 105/64 99 % 11/14/18 0000    92/55   
11/13/18 2324 98.1 °F (36.7 °C) 78 18 (!) 81/52 97 % 11/13/18 1928 98.1 °F (36.7 °C) 81 18 92/53 99 % 11/13/18 [address] 90/60  11/13/18 1718 98.2 °F (36.8 °C) 89 18 101/70 100 % 11/13/18 1645  77 16  100 % 11/13/18 1641 97.9 °F (36.6 °C) 77 17 92/57 99 % 11/13/18 1640  77 17  99 % 11/13/18 1634  82 15  99 % 11/13/18 1615  79 17 93/63 100 % 11/13/18 1400  83 12 99/64 98 % 11/13/18 1229  87 17  100 % 11/13/18 1223  87 15  99 % 11/13/18 1215  84 12  98 % 11/13/18 1121  89 17  99 % 11/13/18 1104  92 17  99 % 11/13/18 1052  92 20 99/61 100 % 11/13/18 [address] Last 3 Recorded Weights in this Encounter 11/13/18 1820 Weight: 81.6 kg (179 lb 14.3 oz)  
 
11/12 1901 - 11/14 0700 In: 1380 [P.O.:480; I.V.:900] Out: - Chart reviewed. Pertinent Notes reviewed. Medication list  reviewed Current Facility-Administered Medications Medication  carvedilol (COREG) tablet 3.125 mg  
 magnesium oxide (MAG-OX) tablet 400 mg  
 sodium chloride (NS) flush 5-10 mL  sodium chloride (NS) flush 5-10 mL  ondansetron (ZOFRAN) injection 4 mg  bisacodyl (DULCOLAX) tablet 5 mg  sodium chloride (NS) flush 5-10 mL  sodium chloride (NS) flush 5-10 mL  aspirin chewable tablet 81 mg  
 labetalol (NORMODYNE;TRANDATE) 5 mg/mL injection 5 mg Data Review : 
Recent Labs 11/14/18 
5986 11/13/18 
1636 11/13/18 
0404 11/13/18 
0202  11/12/18 
1918 WBC 7.0  --  8.8  --   --  9.4 HGB 12.9  --  13.5  --   --  14.5 *  --  155  --   --  185 ANEU  --   --   --   --   --  7.6 INR  --   --   --   --   --  1.7* * 127* 127* 126*  --  122*  
K 5.2* 4.9 5.6* 5.4*   < > 7.1*  
GLU 85 102* 106* 98  --  88  
BUN 21* 22* 21* 22*  --  23* CREA 0.80 0.88 0.89 0.93  --  1.08  
ALT 27  --  29  --   --  31 SGOT 50*  --  50*  --   --  58* TBILI 1.2*  --  1.6*  --   --  1.8* *  --  141*  --   --  169* CA 7.3* 7.6* 8.1* 7.8*  --  8.3* PHOS 3.2  --   --   --   --   --   
 < > = values in this interval not displayed. Lab Results Component Value Date/Time Culture result: NO GROWTH 14 DAYS 02/08/2013 11:10 AM  
 Culture result: NO GROWTH 14 DAYS 02/08/2013 11:10 AM  
 Culture result: NO GROWTH 14 DAYS 12/07/2012 02:08 PM  
 Culture result: NO GROWTH 14 DAYS 12/07/2012 02:08 PM  
 
Lab Results Component Value Date/Time Specimen Description: WOUND DRAINAGE 02/08/2013 11:10 AM  
 Specimen Description: WOUND DRAINAGE 02/08/2013 11:10 AM  
 Specimen Description: HIP 12/07/2012 02:08 PM  
 Specimen Description: HIP 12/07/2012 02:08 PM  
 
No results for input(s): FE, TIBC, PSAT, FERR in the last 72 hours. Lab Results Component Value Date/Time Sodium,urine random 5 11/13/2018 04:04 AM  
 Creatinine, urine 204.00 11/13/2018 11:21 AM  
 
 
 
 
Usha Jose MD 
[facility] 
 [email or web address] [place] [address] [employer] [address] [place], [address] [place] Phone - [phone] Fax - [phone]

## 2018-11-14 NOTE — PROGRESS NOTES
Problem: Falls - Risk of 
Goal: *Absence of Falls Document Alan Suresh Fall Risk and appropriate interventions in the flowsheet. Outcome: Progressing Towards Goal 
Fall Risk Interventions: 
Mobility Interventions: Bed/chair exit alarm Medication Interventions: Bed/chair exit alarm

## 2018-11-14 NOTE — PROGRESS NOTES
Problem: Pressure Injury - Risk of 
Goal: *Prevention of pressure injury Document Sixto Scale and appropriate interventions in the flowsheet. Outcome: Progressing Towards Goal 
Pressure Injury Interventions: Activity Interventions: Increase time out of bed Mobility Interventions: HOB 30 degrees or less Nutrition Interventions: Document food/fluid/supplement intake

## 2018-11-15 PROBLEM — H53.9 VISION CHANGES: Status: ACTIVE | Noted: 2018-11-15

## 2018-11-15 PROBLEM — E87.1 HYPONATREMIA: Status: ACTIVE | Noted: 2018-11-15

## 2018-11-15 LAB
ALBUMIN SERPL-MCNC: 1.7 G/DL (ref 3.5–5)
ALBUMIN/GLOB SERPL: 0.4 {RATIO} (ref 1.1–2.2)
ALP SERPL-CCNC: 150 U/L (ref 45–117)
ALT SERPL-CCNC: 30 U/L (ref 12–78)
ANION GAP SERPL CALC-SCNC: 6 MMOL/L (ref 5–15)
ANION GAP SERPL CALC-SCNC: 9 MMOL/L (ref 5–15)
AST SERPL-CCNC: 55 U/L (ref 15–37)
BILIRUB SERPL-MCNC: 1 MG/DL (ref 0.2–1)
BUN SERPL-MCNC: 21 MG/DL (ref 6–20)
BUN SERPL-MCNC: 23 MG/DL (ref 6–20)
BUN/CREAT SERPL: 23 (ref 12–20)
BUN/CREAT SERPL: 26 (ref 12–20)
CALCIUM SERPL-MCNC: 7.5 MG/DL (ref 8.5–10.1)
CALCIUM SERPL-MCNC: 7.8 MG/DL (ref 8.5–10.1)
CHLORIDE SERPL-SCNC: 98 MMOL/L (ref 97–108)
CHLORIDE SERPL-SCNC: 99 MMOL/L (ref 97–108)
CO2 SERPL-SCNC: 21 MMOL/L (ref 21–32)
CO2 SERPL-SCNC: 23 MMOL/L (ref 21–32)
COMMENT, HOLDF: NORMAL
CREAT SERPL-MCNC: 0.89 MG/DL (ref 0.7–1.3)
CREAT SERPL-MCNC: 0.9 MG/DL (ref 0.7–1.3)
ERYTHROCYTE [DISTWIDTH] IN BLOOD BY AUTOMATED COUNT: 13.9 % (ref 11.5–14.5)
GLOBULIN SER CALC-MCNC: 3.8 G/DL (ref 2–4)
GLUCOSE SERPL-MCNC: 100 MG/DL (ref 65–100)
GLUCOSE SERPL-MCNC: 111 MG/DL (ref 65–100)
HCT VFR BLD AUTO: 36.5 % (ref 36.6–50.3)
HGB BLD-MCNC: 12.7 G/DL (ref 12.1–17)
MCH RBC QN AUTO: 32.4 PG (ref 26–34)
MCHC RBC AUTO-ENTMCNC: 34.8 G/DL (ref 30–36.5)
MCV RBC AUTO: 93.1 FL (ref 80–99)
NRBC # BLD: 0 K/UL (ref 0–0.01)
NRBC BLD-RTO: 0 PER 100 WBC
PHOSPHATE SERPL-MCNC: 3.6 MG/DL (ref 2.6–4.7)
PLATELET # BLD AUTO: 127 K/UL (ref 150–400)
PMV BLD AUTO: 9.1 FL (ref 8.9–12.9)
POTASSIUM SERPL-SCNC: 3.6 MMOL/L (ref 3.5–5.1)
POTASSIUM SERPL-SCNC: 5 MMOL/L (ref 3.5–5.1)
PROT SERPL-MCNC: 5.5 G/DL (ref 6.4–8.2)
RBC # BLD AUTO: 3.92 M/UL (ref 4.1–5.7)
SAMPLES BEING HELD,HOLD: NORMAL
SODIUM SERPL-SCNC: 127 MMOL/L (ref 136–145)
SODIUM SERPL-SCNC: 129 MMOL/L (ref 136–145)
WBC # BLD AUTO: 5.9 K/UL (ref 4.1–11.1)

## 2018-11-15 PROCEDURE — 94760 N-INVAS EAR/PLS OXIMETRY 1: CPT

## 2018-11-15 PROCEDURE — 99218 HC RM OBSERVATION: CPT

## 2018-11-15 PROCEDURE — 74011250637 HC RX REV CODE- 250/637: Performed by: INTERNAL MEDICINE

## 2018-11-15 PROCEDURE — 65660000000 HC RM CCU STEPDOWN

## 2018-11-15 PROCEDURE — 80053 COMPREHEN METABOLIC PANEL: CPT

## 2018-11-15 PROCEDURE — 85027 COMPLETE CBC AUTOMATED: CPT

## 2018-11-15 PROCEDURE — 84100 ASSAY OF PHOSPHORUS: CPT

## 2018-11-15 PROCEDURE — 74011250636 HC RX REV CODE- 250/636: Performed by: INTERNAL MEDICINE

## 2018-11-15 PROCEDURE — 36415 COLL VENOUS BLD VENIPUNCTURE: CPT

## 2018-11-15 PROCEDURE — 74011250637 HC RX REV CODE- 250/637: Performed by: HOSPITALIST

## 2018-11-15 RX ORDER — MIDODRINE HYDROCHLORIDE 5 MG/1
5 TABLET ORAL ONCE
Status: COMPLETED | OUTPATIENT
Start: 2018-11-15 | End: 2018-11-15

## 2018-11-15 RX ORDER — MIDODRINE HYDROCHLORIDE 5 MG/1
5 TABLET ORAL
Status: DISCONTINUED | OUTPATIENT
Start: 2018-11-15 | End: 2018-11-16 | Stop reason: HOSPADM

## 2018-11-15 RX ADMIN — Medication 10 ML: at 22:49

## 2018-11-15 RX ADMIN — Medication 10 ML: at 16:16

## 2018-11-15 RX ADMIN — Medication 10 ML: at 03:45

## 2018-11-15 RX ADMIN — SODIUM CHLORIDE 250 ML: 900 INJECTION, SOLUTION INTRAVENOUS at 17:12

## 2018-11-15 RX ADMIN — MIDODRINE HYDROCHLORIDE 5 MG: 5 TABLET ORAL at 17:12

## 2018-11-15 RX ADMIN — Medication 400 MG: at 09:31

## 2018-11-15 RX ADMIN — Medication 10 ML: at 16:15

## 2018-11-15 RX ADMIN — SODIUM CHLORIDE TAB 1 GM 1 G: 1 TAB at 16:13

## 2018-11-15 RX ADMIN — SODIUM CHLORIDE TAB 1 GM 1 G: 1 TAB at 22:49

## 2018-11-15 RX ADMIN — MIDODRINE HYDROCHLORIDE 5 MG: 5 TABLET ORAL at 05:23

## 2018-11-15 RX ADMIN — SODIUM CHLORIDE TAB 1 GM 1 G: 1 TAB at 11:59

## 2018-11-15 RX ADMIN — ASPIRIN 81 MG 81 MG: 81 TABLET ORAL at 09:31

## 2018-11-15 NOTE — PHYSICIAN ADVISORY
Letter of Status Determination:  
Recommend hospitalization status upgraded from OBSERVATION  to INPATIENT  Status Pt Name:  Casper Noble MR#  
JERZY # [id number] / 
[id number] Payor: Billy Lovell / Plan: [address] / Product Type: Medicare /   
SHANNAN#  [id number] Room and Hospital  3119/01  @ [facility] Hospitalization date  11/13/2018  1:31 AM  
Current Attending Physician  Cata Matthew MD  
Principal diagnosis  <principal problem not specified> Hyperkalemia Clinicals  77 y.o. y.o  male hospitalized with above diagnosis The pt developed significant metabolic /electrolyte derangements leading to systemic symptoms e.g., weakness, diplopia etc.  
He was found to have hyperkalemia, hyponatremia. This pt suffers from complex past medical history including Cirrhosis if liver, HTN etc.  
 
Due to above reasons his care has now extended to >2 midnights in acute care setting. [place] (MCG) criteria Does  NOT apply STATUS DETERMINATION  This patient is at above high risk of deterioration based on documented presenting clinical data, comorbid conditions, high risk of adverse events and current acute care course. Mr. Casper Noble now meets Inpatient Admission status criteria in accordance with CMS regulation Section 43 .3. Specifically, due to medical necessity the patient's stay now exceeds Two Midnights. It is our recommendation that this patient's hospitalization status should be upgraded from  OBSERVATION to INPATIENT status. The final decision of the patient's hospitalization status depends on the attending physician's judgment Additional comments Payor: Billy Lovell / Plan: [address] / Product Type: Medicare /   
  
 
Mayte Torres MD MPH FAC Cell: [phone] Physician Advisor  [address] Documentation Management Program 
 [address] [facility]  
President Medical Staff, [address] [facility]   
Cell  [phone]   
 
 
[phone] Rosendo Mazariegos

## 2018-11-15 NOTE — PROGRESS NOTES
Occupational Therapy EVALUATION/discharge Patient: Luci Blackburn (20 y.o. male) Date: 11/14/2018 Primary Diagnosis: Hyperkalemia Precautions: fall, standard ASSESSMENT:  
Based on the objective data described below, the patient presents with near baseline level of functioning for self care and mobility. No LOB occurred during evaluation and pt reports independence in bathing and dressing this date. Pt is generally at his baseline, but reports decreased strength and endurance related to his medical condition. Pt anticipates discharge soon. He would benefit from HHPT vs. Outpatient PT (would likely need transportation) to perform [facility] safety evaluation and develop a home exercises program to build strength and endurance. No acute OT needs at this time. Encouraged pt to be up OOB as much as possible per nursing supervision. Pt verbalized understanding. Pt appears sad re: his medical condition, maintaining his home/property, and regarding his future health, he stated that knows he will die the same way his father did with CHF .  (Would pt benefit from  visit? Or Palliative Care?) Discussed with . Discussed with nursing as pt has questions re: his peritoneal drain. Further skilled acute occupational therapy is not indicated at this time. Discharge Recommendations: None home with family support. Further Equipment Recommendations for Discharge: encouraged pt to place his [facility] over the toilet at home and to use his adaptive aids for lower body adls and his reacher for picking up dropped objects from the floor. SUBJECTIVE:  
Patient seems to have a sad affect. He reports that he took 6L off which he stated was too much (pt has peritoneal drain) OBJECTIVE DATA SUMMARY:  
HISTORY:  
Past Medical History:  
Diagnosis Date  Arrhythmia   
 intermittent tachycardia  Breast lump   
 bilateral 6-8 weeks  Congestive heart failure ([address]  Hypertension  Liver disease   
 alcoholic cirrhosis  Psychiatric disorder   
 anxiety and depression Past Surgical History:  
Procedure Laterality Date  HX COLONOSCOPY    
 HX HEENT  1980's  
 oral surgery  HX HERNIA REPAIR Right 2009  
 ingunial  
 HX ORTHOPAEDIC  12/7/2012 REMOVAL OF PROSTALAC IMPLANT, REIMPLANTATION LEFT TOTAL HIP ARTHROPLASTY  REVISE TOTAL HIP REPLACEMENT Left 03/2013  TOTAL HIP ARTHROPLASTY Bilateral   
 bilateral hip replacement Prior Level of Function/Environment/Context: Pt lives alone with his dog. He maintains his home and property and is independent in adls and IADLs. Occupations in which the patient is/was successful, what are the barriers preventing that success:  
Performance Patterns (routines, roles, habits, and rituals):  
Personal Interests and/or values:  
Expanded or extensive additional review of patient history: has peritoneal drain, B hip arthroplasties Sam Rodriguez MD) - uses cane , liver cirrohsis and CHF diagnoses Home Situation Home Environment: Private residence # Steps to Enter: 3 Rails to Enter: No 
One/Two Story Residence: One story Living Alone: Yes Support Systems: Family member(s) Patient Expects to be Discharged to[de-identified] Private residence Current DME Used/Available at Home: [facility] [place], [facility] Tub or Shower Type: Tub/Shower combination Hand dominance: Right EXAMINATION OF PERFORMANCE DEFICITS: 
Cognitive/Behavioral Status: 
Neurologic State: Alert Orientation Level: Appropriate for age Cognition: Appropriate for age attention/concentration; Follows commands Perception: Appears intact Perseveration: No perseveration noted Safety/Judgement: Awareness of environment; Fall prevention; Insight into deficits Skin: generally intact,  Has peritoneal drain that pt manages at home Edema: none observed Hearing: Auditory Auditory Impairment: None Vision/Perceptual: Tracking: Able to track stimulus in all quadrants w/o difficulty Corrective Lenses: Glasses Range of Motion: BUEs:   
AROM: Within functional limits PROM: Within functional limits Strength: BUEs:   
Strength: Generally decreased, functional(low endurance) Coordination: 
Coordination: Within functional limits Fine Motor Skills-Upper: Left Intact; Right Intact Gross Motor Skills-Upper: Left Intact; Right Intact Tone & Sensation: Tone is normal 
  
Sensation: Intact Balance: 
Sitting: Intact Standing: Intact(has a reacher at home to  dropped objects) Standing - Static: Good Standing - Dynamic : Good(supervision for safety) Functional Mobility and Transfers for ADLs:Bed Mobility: 
Supine to Sit: Independent Sit to Supine: Independent Scooting: Independent Transfers: 
Sit to Stand: Supervision Stand to Sit: Supervision Bathroom Mobility: Supervision/set up Toilet Transfer : Supervision(rec. placing BSC over home toilet) ADL Assessment: 
Feeding: Independent Oral Facial Hygiene/Grooming: Independent Bathing: Supervision;Stand-by assistance;Setup Upper Body Dressing: Independent;Setup Lower Body Dressing: Independent;Setup(has adaptive aids to use at home) Toileting: Independent Meal Preparation: (independent at baseline.) ADL Intervention and task modifications: Pt performed bed mobility, standing adls, bathroom mobility. All with supervision. No complaints of fatigue. Pt reports feeling better this date. His niece was present and supportive. Pt was encouraged to use his BSC over the toilet to increase comfort, and to use his adaptive aids for lower body adls. Pt has a reacher at home and encouraged pt to use as he builds his strength and endurance during adls. .  Educated in home safety and fall prevention basics. Cognitive Retraining Safety/Judgement: Awareness of environment; Fall prevention; Insight into deficits Therapeutic Exercise: 
Encouraged OOB frequently throughout the day Functional Measure: 
Barthel Index: 
 
Bathin Bladder: 10 Bowels: 10 
Groomin Dressing: 10 Feeding: 10 Mobility: 0 Stairs: 0 Toilet Use: 10 Transfer (Bed to Chair and Back): 15 Total: 75 Barthel and G-code impairment scale: 
Percentage of impairment CH 
0% CI 
1-19% CJ 
20-39% CK 
40-59% CL 
60-79% CM 
80-99% CN 
100% Barthel Score 0-100 100 99-80 79-60 59-40 20-39 1-19 
 0 Barthel Score 0-20 20 17-19 13-16 9-12 5-8 1-4 0 The Barthel ADL Index: Guidelines 1. The index should be used as a record of what a patient does, not as a record of what a patient could do. 2. The main aim is to establish degree of independence from any help, physical or verbal, however minor and for whatever reason. 3. The need for supervision renders the patient not independent. 4. A patient's performance should be established using the best available evidence. Asking the patient, friends/relatives and nurses are the usual sources, but direct observation and common sense are also important. However direct testing is not needed. 5. Usually the patient's performance over the preceding 24-48 hours is important, but occasionally longer periods will be relevant. 6. Middle categories imply that the patient supplies over 50 per cent of the effort. 7. Use of aids to be independent is allowed. Reginaldo Raygoza., Barthel, D.W. (5035). Functional evaluation: the Barthel Index. [facility] (14)2. NEEMA Nielsen, Jennifer Sanchez., Jenn Elkins, [place], [address] Measuring the change indisability after inpatient rehabilitation; comparison of the responsiveness of the Barthel Index and Functional [place] Measure. Journal of Neurology, Neurosurgery, and Psychiatry, 66(4), 150-605. JEFF Pride, BRIAN Medina, & Lori Barnes M.A. (2004.) Assessment of post-stroke quality of life in cost-effectiveness studies: The usefulness of the Barthel Index and the EuroQoL-5D. [facility], 28, 014-38 G codes: In compliance with CMSs Claims Based Outcome Reporting, the following G-code set was chosen for this patient based on their primary functional limitation being treated: The outcome measure chosen to determine the severity of the functional limitation was the Barthel index with a score of 75/100 which was correlated with the impairment scale. ? Self Care:  
  - CURRENT STATUS: CJ - 20%-39% impaired, limited or restricted  - GOAL STATUS: CJ - 20%-39% impaired, limited or restricted  - D/C STATUS:  CJ - 20%-39% impaired, limited or restricted Occupational Therapy Evaluation Charge Determination History Examination Decision-Making MEDIUM Complexity : Expanded review of history including physical, cognitive and psychosocial  history  MEDIUM Complexity : 3-5 performance deficits relating to physical, cognitive , or psychosocial skils that result in activity limitations and / or participation restrictions MEDIUM Complexity : Patient may present with comorbidities that affect occupational performnce. Miniml to moderate modification of tasks or assistance (eg, physical or verbal ) with assesment(s) is necessary to enable patient to complete evaluation Based on the above components, the patient evaluation is determined to be of the following complexity level: MEDIUM Pain: 
Pain Scale 1: Numeric (0 - 10) Pain Intensity 1: 0 Activity Tolerance:  
Good. No complaints After treatment:  
[]  Patient left in no apparent distress sitting up in chair 
[x]  Patient left in no apparent distress in bed 
[x]  Call bell left within reach [x]  Nursing notified 
[x]  Caregiver present 
[]  Bed alarm activated COMMUNICATION/EDUCATION:  
Communication/Collaboration: 
[x]      Home safety education was provided and the patient/caregiver indicated understanding. [x]      Patient/family have participated as able and agree with findings and recommendations. []      Patient is unable to participate in plan of care at this time. Findings and recommendations were discussed with: Registered Nurse Sofia Camacho OTR/L Time Calculation: 31 mins

## 2018-11-15 NOTE — PROGRESS NOTES
Hospitalist Progress NoteNAME: Niya Ramos :  1950 MRN:  [id number] Admit date: 2018 Today's date: 18 PCP: MD Samir Iyer M.D. Cell [phone] Assessment / Plan: 
 
Gait instability ,chronic intermittent blurry vision POA Clinically improved Suspect related to electrolyte abnormalities Neuro consult followed MRI brain negative for acute stroke 
  
Hyperkalemia POA Likely recent aldactone use, high K diet Nearly resolved s/p kayexylate Hold Aldactone and KCL Serial labs 
  
  
Hyponatremia High urine osmolarity and low urine Na Held diuretics IVF 
PO fluid restrict Nephrology consult  following 
  
Liver cirrhosis Check ammonia level  
  
HTN Continue home antihypertensive med  
  
Overweight POA Body mass index is 26.57 kg/m². 
  
Code Status: full code Surrogate Decision Maker: 
  
DVT Prophylaxis: SCD 
GI Prophylaxis: not indicated 
  
Baseline: independent Subjective: Chief Complaint / Reason for Physician Visit \"I feel more steady when I get up\". Discussed with RN events overnight. Overall feels better Review of Systems: 
Symptom Y/N Comments  Symptom Y/N Comments Fever/Chills    Chest Pain Poor Appetite    Edema Cough    Abdominal Pain Sputum    Joint Pain SOB/BEYER    Headache Nausea/vomit    Tolerating PT/OT Diarrhea    Tolerating Diet Constipation    Other Could NOT obtain due to:   
 
Objective: VITALS:  
Last 24hrs VS reviewed since prior progress note. Most recent are: 
Patient Vitals for the past 24 hrs: 
 Temp Pulse Resp BP SpO2  
18 1930 97.8 °F (36.6 °C) 73 18 97/63 100 % 18 1510 97.6 °F (36.4 °C) 72 18 (!) 81/50 100 % 18 1101 97.6 °F (36.4 °C) 99 18 92/62 96 % 18 0742 98 °F (36.7 °C) (!) 103 18 103/67 99 % 18 0349 97.9 °F (36.6 °C) 83 16 105/64 99 % 18 0000    92/55  11/13/18 2324 98.1 °F (36.7 °C) 78 18 (!) 81/52 97 % Intake/Output Summary (Last 24 hours) at 11/14/2018 2201 Last data filed at 11/14/2018 2037 Gross per 24 hour Intake 2520 ml Output 400 ml Net 2120 ml Wt Readings from Last 12 Encounters:  
11/13/18 81.6 kg (179 lb 14.3 oz)  
11/13/18 77.1 kg (169 lb 15.6 oz)  
11/12/18 77.1 kg (170 lb) 09/06/18 82.1 kg (180 lb 14.4 oz) 08/20/18 87.1 kg (192 lb)  
07/26/18 89 kg (196 lb 1.6 oz) 05/24/18 93.9 kg (207 lb) 04/12/18 91.6 kg (202 lb)  
03/29/18 91.9 kg (202 lb 11.2 oz) 03/17/18 86.2 kg (190 lb) 03/09/18 94.3 kg (208 lb) 02/01/18 92.2 kg (203 lb 3.2 oz) PHYSICAL EXAM: 
General: WD, WN. Alert, cooperative, no acute distress   
EENT:  PERRL. Anicteric sclerae. MMM Neck:  No meningismus, no thyromegaly Resp:  CTA bilaterally, no wheezing or rales. No accessory muscle use CV:  Regular  rhythm,  No edema GI:  Soft, Non distended, Non tender.  +Bowel sounds, no rebound` Peritoneal drain in place Neurologic:  Alert and oriented X 3, normal speech, non focal motor exam 
Psych:   Good insight. Not anxious nor agitated Skin:  No rashes. No jaundice Reviewed most current lab test results and cultures  YES Reviewed most current radiology test results   YES Review and summation of old records today    NO Reviewed patient's current orders and MAR    YES 
PMH/SH reviewed - no change compared to H&P 
________________________________________________________________________ Care Plan discussed with: 
  Comments Patient x Family  x   
RN x Care Manager Consultant Multidiciplinary team rounds were held today with , nursing, pharmacist and clinical coordinator. Patient's plan of care was discussed; medications were reviewed and discharge planning was addressed. ________________________________________________________________________ Total NON critical care TIME:  25  Minutes Total CRITICAL CARE TIME Spent:   Minutes non procedure based Comments >50% of visit spent in counseling and coordination of care    
________________________________________________________________________ Griffin Parsons MD  
 
Procedures: see electronic medical records for all procedures/Xrays and details which were not copied into this note but were reviewed prior to creation of Plan. LABS: 
I reviewed today's most current labs and imaging studies. Pertinent labs include: 
Recent Labs 11/14/18 
0353 11/13/18 0404 11/12/18 1918 WBC 7.0 8.8 9.4 HGB 12.9 13.5 14.5 HCT 37.4 38.7 41.8 * 155 185 Recent Labs 11/14/18 
1902 11/14/18 
0353 11/13/18 
1636 11/13/18 
0404  11/12/18 1918 * 125* 127* 127*   < > 122*  
K 5.0 5.2* 4.9 5.6*   < > 7.1*  
CL 98 98 98 100   < > 95* CO2 23 21 22 21   < > 21  85 102* 106*   < > 88 BUN 28* 21* 22* 21*   < > 23* CREA 0.89 0.80 0.88 0.89   < > 1.08  
CA 7.5* 7.3* 7.6* 8.1*   < > 8.3* PHOS  --  3.2  --   --   --   --   
ALB  --  1.6*  --  1.8*  --  1.9* TBILI  --  1.2*  --  1.6*  --  1.8* SGOT  --  50*  --  50*  --  58* ALT  --  27  --  29  --  31 INR  --   --   --   --   --  1.7*  
 < > = values in this interval not displayed.

## 2018-11-15 NOTE — PROGRESS NOTES
Problem: Falls - Risk of 
Goal: *Absence of Falls Document April Leeann Fall Risk and appropriate interventions in the flowsheet. Outcome: Progressing Towards Goal 
Fall Risk Interventions: 
Mobility Interventions: Patient to call before getting OOB Medication Interventions: Evaluate medications/consider consulting pharmacy

## 2018-11-15 NOTE — PROGRESS NOTES
Problem: Pressure Injury - Risk of 
Goal: *Prevention of pressure injury Document Sixto Scale and appropriate interventions in the flowsheet. Outcome: Progressing Towards Goal 
Pressure Injury Interventions: Activity Interventions: Increase time out of bed Mobility Interventions: Chair cushion Nutrition Interventions: Document food/fluid/supplement intake

## 2018-11-15 NOTE — PROGRESS NOTES
**Consult Information** 
[facility] MRN: [id number] Consult ID: [id number] Facility Time Zone: ET 
Date and Time of Consult: 11/15/2018 04:16:53 AM 
Requesting Clinician: Carlos Wade Time of Call : 11/15/2018 04:22:00 AM 
Patient Name: Vianca Bowens Date of Birth: [id number] Gender: Male **Clinical Note** Clinical Note: pt admitted for increase potassium levels, gait instability , weakness and double vision. while here pt also has been having low sodium levels, last  on 11/14/18. Renal doctors stopped pt's IV fluids and placed him on 1200ml fluid restriction to increase NA. while here pt has been having low BP's, SBP in 90- low 100's. Pt's current BP now 84/61. should pt receive small bolus ? One time dose of midodrine 5 mg was ordered. Patient has lover cirrhosis which may be contributing to his low blood pressure. ALso discontinued oral coreg. Will monitor blood pressure.

## 2018-11-15 NOTE — ROUTINE PROCESS
Bedside shift change report given to [address] [place] [address],[address] (oncoming nurse) by [facility] RN (offgoing nurse). Report included the following information SBAR, Kardex, Intake/Output, MAR and Recent Results. 
  
Zone Phone:   [phone] 
  
  
Significant changes during shift: none  
  
Patient Information 
  
Андрей Isaacs 
77 y.o. 
11/13/2018  1:31 AM by Mukund Bryson MD. Андрей Isaacs was admitted from Home 
  
Problem List 
  
    
Patient Active Problem List  
  Diagnosis Date Noted  Hyperkalemia 11/13/2018  Bilateral carotid artery stenosis 11/13/2018  Dizziness and giddiness 11/13/2018  Ascites due to chronic alcoholic hepatitis 83/60/0807  Colon AV malformation 10/03/2014  Diverticulosis of colon 10/03/2014  Colon polyps 10/03/2014  S/P inguinal hernia repair 08/04/2014  Cirrhosis, alcoholic ([facility] 34/56/7684  S/P hip replacement 03/07/2013  
  
    
Past Medical History:  
Diagnosis Date  Arrhythmia    
  intermittent tachycardia  Breast lump    
  bilateral 6-8 weeks  Congestive heart failure (HCC)    
 Hypertension    
 Liver disease    
  alcoholic cirrhosis  Psychiatric disorder    
  anxiety and depression  
  
  
  
Core Measures: 
  
CVA: Yes Yes 
  
Activity Status: 
  
OOB to Chair yes Ambulated this shift Yes Bed Rest No 
  
DVT prophylaxis: 
  
DVT prophylaxis Med- No 
DVT prophylaxis SCD or NEMESIO- Yes  
  
Wounds: (If Applicable) 
  
Wounds- No 
  
Patient Safety: 
  
Falls Score Total Score: 2 Safety Level_______ Bed Alarm On? No 
Sitter? No 
  
Plan for upcoming shift: safety, electrolytes   
  
  
Discharge Plan: No  
  
Active Consults: 
IP CONSULT TO HOSPITALIST 
IP CONSULT TO NEUROLOGY 
IP CONSULT TO NEPHROLOGY

## 2018-11-15 NOTE — ROUTINE PROCESS
Bedside shift change report given to Andre Salomon RN (oncoming nurse) by Sarmad Cage RN (offgoing nurse). Report included the following information SBAR, Kardex, Intake/Output, MAR and Recent Results. 
  
Zone Phone:   [phone] 
  
  
Significant changes during shift:  fluid restriction, IV fluids discontinued 
  
  
Patient Information 
  
Luci Blackburn 
77 y.o. 
11/13/2018  1:31 AM by Jimbo Solano MD. Luci Blackburn was admitted from Home 
  
Problem List 
  
    
Patient Active Problem List  
  Diagnosis Date Noted  Hyperkalemia 11/13/2018  Bilateral carotid artery stenosis 11/13/2018  Dizziness and giddiness 11/13/2018  Ascites due to chronic alcoholic hepatitis 06/98/3120  Colon AV malformation 10/03/2014  Diverticulosis of colon 10/03/2014  Colon polyps 10/03/2014  S/P inguinal hernia repair 08/04/2014  Cirrhosis, alcoholic ([address] 25/96/6541  S/P hip replacement 03/07/2013  
  
    
Past Medical History:  
Diagnosis Date  Arrhythmia    
  intermittent tachycardia  Breast lump    
  bilateral 6-8 weeks  Congestive heart failure (HCC)    
 Hypertension    
 Liver disease    
  alcoholic cirrhosis  Psychiatric disorder    
  anxiety and depression  
  
  
  
Core Measures: 
  
CVA: Yes Yes 
  
Activity Status: 
  
OOB to Chair No 
Ambulated this shift Yes Bed Rest No 
  
DVT prophylaxis: 
  
DVT prophylaxis Med- No 
DVT prophylaxis SCD or NEMESIO- Yes  
  
Wounds: (If Applicable) 
  
Wounds- No 
  
Patient Safety: 
  
Falls Score Total Score: 2 Safety Level_______ Bed Alarm On? No 
Sitter? No 
  
Plan for upcoming shift: safety, electrolytes   
  
  
Discharge Plan: No  
  
Active Consults: 
IP CONSULT TO HOSPITALIST 
IP CONSULT TO NEUROLOGY 
IP CONSULT TO NEPHROLOGY

## 2018-11-16 ENCOUNTER — HOME HEALTH ADMISSION (OUTPATIENT)
Dept: HOME HEALTH SERVICES | Facility: HOME HEALTH | Age: 68
End: 2018-11-16

## 2018-11-16 ENCOUNTER — HOME HEALTH ADMISSION (OUTPATIENT)
Dept: HOME HEALTH SERVICES | Facility: HOME HEALTH | Age: 68
End: 2018-11-16
Payer: MEDICARE

## 2018-11-16 VITALS
BODY MASS INDEX: 27.25 KG/M2 | WEIGHT: 184.53 LBS | RESPIRATION RATE: 20 BRPM | SYSTOLIC BLOOD PRESSURE: 93 MMHG | HEART RATE: 96 BPM | TEMPERATURE: 98.2 F | DIASTOLIC BLOOD PRESSURE: 63 MMHG | OXYGEN SATURATION: 97 %

## 2018-11-16 LAB
ALBUMIN SERPL-MCNC: 1.5 G/DL (ref 3.5–5)
ANION GAP SERPL CALC-SCNC: 8 MMOL/L (ref 5–15)
BUN SERPL-MCNC: 22 MG/DL (ref 6–20)
BUN/CREAT SERPL: 25 (ref 12–20)
CALCIUM SERPL-MCNC: 7.4 MG/DL (ref 8.5–10.1)
CHLORIDE SERPL-SCNC: 99 MMOL/L (ref 97–108)
CO2 SERPL-SCNC: 23 MMOL/L (ref 21–32)
CREAT SERPL-MCNC: 0.87 MG/DL (ref 0.7–1.3)
GLUCOSE SERPL-MCNC: 84 MG/DL (ref 65–100)
MAGNESIUM SERPL-MCNC: 2 MG/DL (ref 1.6–2.4)
PHOSPHATE SERPL-MCNC: 3.5 MG/DL (ref 2.6–4.7)
POTASSIUM SERPL-SCNC: 3.7 MMOL/L (ref 3.5–5.1)
SODIUM SERPL-SCNC: 130 MMOL/L (ref 136–145)

## 2018-11-16 PROCEDURE — 80069 RENAL FUNCTION PANEL: CPT

## 2018-11-16 PROCEDURE — 74011250637 HC RX REV CODE- 250/637: Performed by: INTERNAL MEDICINE

## 2018-11-16 PROCEDURE — 36415 COLL VENOUS BLD VENIPUNCTURE: CPT

## 2018-11-16 PROCEDURE — 83735 ASSAY OF MAGNESIUM: CPT

## 2018-11-16 RX ORDER — MIDODRINE HYDROCHLORIDE 2.5 MG/1
2.5 TABLET ORAL
Qty: 90 TAB | Refills: 1 | Status: SHIPPED | OUTPATIENT
Start: 2018-11-16 | End: 2018-12-06

## 2018-11-16 RX ADMIN — Medication 400 MG: at 09:31

## 2018-11-16 RX ADMIN — SODIUM CHLORIDE TAB 1 GM 1 G: 1 TAB at 09:31

## 2018-11-16 RX ADMIN — ASPIRIN 81 MG 81 MG: 81 TABLET ORAL at 09:31

## 2018-11-16 RX ADMIN — Medication 10 ML: at 06:30

## 2018-11-16 RX ADMIN — MIDODRINE HYDROCHLORIDE 5 MG: 5 TABLET ORAL at 13:00

## 2018-11-16 RX ADMIN — MIDODRINE HYDROCHLORIDE 5 MG: 5 TABLET ORAL at 09:31

## 2018-11-16 NOTE — PROGRESS NOTES
Pt is discharged and his family will transport him by car. Referral sent to [facility] home health and they accepted. Pt will be seen for PT and Nursing services. CM spoke with Milan Nelson, liaison [facility] and informed her that pt will be at his niece's house for the weekend and maybe next week in the [place] area and then will go back to his home in [place], [place].  TEE Massey will follow pt at both locations. F/u appts made and documented on the discharge paperwork. Pt's brother owns [employer] and will supply personal care aides to pt at his home if needed since pt lives alone. Care Management Interventions PCP Verified by CM: Yes Mode of Transport at Discharge: Other (see comment)(pt's family transporting pt home by car) Transition of Care Consult (CM Consult): Discharge Planning, Home Health [address]: Yes Discharge Durable Medical Equipment: No 
Physical Therapy Consult: Yes Occupational Therapy Consult: Yes Speech Therapy Consult: Yes Current Support Network: Lives Alone(Lives alone in own home and independent and active. He drives. He has a cane and a walker. Radha Tarango ) Confirm Follow Up Transport: Family Plan discussed with Pt/Family/Caregiver: Yes Freedom of Choice Offered: Yes Discharge Location Discharge Placement: Home with home health Marjan Feldman, [address] [place] [address]

## 2018-11-16 NOTE — DISCHARGE SUMMARY
Hospitalist Discharge Note    NAME: Scott Jasmine   :  1950   MRN:  [id number]     Admit date: 2018    Discharge date: 18    PCP: Дмитрий HUNG MD    Discharge Diagnoses:    Hypotension likely chronic with underlying cirrhosis POA    Dizziness likely low Na and hypotension POA resolved     Hyponatremia Na 122 due to cirrhosis POA    Gait instability, chronic intermittent blurry vision POA     Hyperkalemia K 7.1 POA Likely recent aldactone use, high K diet, PO KCL     Liver cirrhosis POA    Ascites from portal HTN POA implanted peritoneal drain      Essential HTN POA     Overweight POA Body mass index is 27.25 kg/m².     Code Status: full code    Discharge Medications:  Current Discharge Medication List      START taking these medications    Details   midodrine (PROAMITINE) 2.5 mg tablet Take 1 Tab by mouth three (3) times daily (with meals) for 30 days. Qty: 90 Tab, Refills: 1         CONTINUE these medications which have NOT CHANGED    Details   magnesium oxide (MAG-OX) 400 mg tablet Take 400 mg by mouth daily. Indications: hypomagnesemia      QS-ZV-ER-Fe-Min-Lycopen-Lutein (CENTRUM) 0.4-162-18 mg Tab Take 1 Tab by mouth daily. STOP taking these medications       potassium chloride (K-DUR, KLOR-CON) 20 mEq tablet Comments:   Reason for Stopping:         furosemide (LASIX) 40 mg tablet Comments:   Reason for Stopping:         carvedilol (COREG) 3.125 mg tablet Comments:   Reason for Stopping:                Follow-up Information     Follow up With Specialties Details Why Contact Info    Blanca Ibanez MD Internal Medicine Go on 2018 Please follow up on 2018 at 10:00 [address]  PT and Nursing services  [facility] [phone] [facility]    Chaz Quinones MD Gastroenterology  if you want to see a GI specialist in [place] to help manage the liver disease [address] [phone]            Time spent on discharge:   I spent greater than 30 minutes on discharge, seeing and examining the patient, reconciling home meds and new meds, coordinating care with case management, doing the discharge papers and the D/C summary    Discharge disposition: home with home health    Discharge Condition: Stable    Summary of admission H+P(copied from Dr Kenn Isaacs Note):     CHIEF COMPLAINT: gait instability      HISTORY OF PRESENT ILLNESS:     Piyush Ledezma is a 77 y.o.  male with Liver cirrhosis ,HTN , who transferred from [facility] ED because of gait instability and hyperkalemia 7.1 , according to the patient he had gait instability today and generalized weakness , patient also complaining from double vision intermittent last episode was last Thursday , denies any Dizziness any headache any nausea any vomiting .     'We were asked to admit for work up and evaluation of the above problems.           Past Medical History:   Diagnosis Date    Arrhythmia       intermittent tachycardia    Breast lump       bilateral 6-8 weeks    Congestive heart failure ([address]      Hypertension      Liver disease       alcoholic cirrhosis    Psychiatric disorder       anxiety and depression     Admit pCXR FINDINGS read by radiology:   AP portable imaging of the chest performed at 7:15 PM demonstrates a  stable cardiomediastinal silhouette. There is mild bibasilar linear atelectasis  or scarring, similar to the prior study. No new airspace disease or pleural  effusion is evident. No significant osseous abnormalities are seen. IMPRESSION: Mild bibasilar linear atelectasis or scarring. No evidence of pneumonia or pleural effusion. Admit head CT scan read by radiology FINDINGS:   The ventricles and cortical sulci are appropriate in size and  configuration.  There is no evidence of intracranial hemorrhage, mass, mass  effect, or acute infarct. No extra-axial fluid collections are seen. The  visualized paranasal sinuses and mastoid air cells are clear. The orbital  structures are unremarkable. No osseous abnormalities are seen. IMPRESSION: No acute intracranial process. MRI brain read by radiology FINDINGS:    Ventricles:  Midline, no hydrocephalus. Generalized atrophy. Brain Parenchyma/Brainstem:  Mild chronic white matter disease in the  supratentorial brain. Small remote lacunar infarction deep left parietal lobe. No acute infarction. Intracranial Hemorrhage:  None. Basal Cisterns:  Normal.   Flow Voids:  Normal.  Additional Comments:  Noncontrast orbital sequences demonstrate symmetric size  and signal of the optic nerves. No intraorbital mass or inflammation allowing  for lack of contrast material. Optic chiasm and suprasellar cistern are normal.  Incidental 8 mm T2 hyperintense structure in the left parotid gland may  represent a lymph node (several smaller lymph nodes in the periphery of the  superficial lobe evident). IMPRESSION:  Atrophy and chronic white matter disease, with no acute intracranial process.     Bilateral carotid dopplers read by neurology INTERPRETATION/FINDINGS  PROCEDURE:  Carotid Duplex Examination using B-mode, color and  spectral Doppler of the extracranial cerebrovascular arteries. 1. Bilateral <50% stenosis of the internal carotid arteries. 2. No significant stenosis in the external carotid arteries  bilaterally. 3. Antegrade flow in both vertebral arteries. 4. Normal flow in both subclavian arteries. Plaque Morphology:  1. Homogeneous plaque in the bulb and right ICA (Minimal). 2. Calcific plaque in the bulb and left ICA.     Echo TTE results read by cardiology  LEFT VENTRICLE: Size was normal. Systolic function was normal. Ejection  fraction was estimated in the range of 55 % to 60 %.  There was hypokinesis  of the mid to distal segments of all walls with vigorous contraction of  the basal segments. Wall thickness was normal. DOPPLER: Left ventricular  diastolic function parameters were normal for the patient's age. RIGHT VENTRICLE: The size was normal. Systolic function was normal. Wall  thickness was normal.  LEFT ATRIUM: Size was normal.  RIGHT ATRIUM: Size was normal.  MITRAL VALVE: Normal valve structure. DOPPLER: There was trivial regurgitation. AORTIC VALVE: The valve was trileaflet. DOPPLER: There was no stenosis. There was no regurgitation. TRICUSPID VALVE: Normal valve structure. DOPPLER: There was trivial  regurgitation. Pulmonary artery systolic pressure was within the normal range. PULMONIC VALVE: Leaflets exhibited normal thickness, no calcification, and  normal cuspal separation. DOPPLER: The transpulmonic velocity was within  the normal range. There was no regurgitation. AORTA: The root exhibited normal size. PERICARDIUM: There was no pericardial effusion. The pericardium was normal  in appearance.       Hospital course:       Hypotension likely chronic with underlying cirrhosis  Dizziness likely low Na and hypotension POA resolved  Chronic component, SBP ranges 90 to 110  Dropped to 80s day prior to discharge, not symptomatic  Gentle IVF overnight  Echo WNL  Added midodrine  BP and Na stable overnight, plan discharge home on midodrine  Hold diuretics and BP meds  D/W patient to remove less than 3 liters when draining his ascites     Hyponatremia Na 122 due to cirrhosis POA  High urine osmolarity and low urine Na  Held diuretics at admit  S/P IVF  PO fluid restrict, salt tablets(tablets stopped at discharge)  Nephrology consult followed Dr Heaven Astudillo  Improved to 130 at discharge    Gait instability, chronic intermittent blurry vision POA  Clinically improved  Suspect related to electrolyte abnormalities and hypotension  Neuro consult followed  MRI brain negative for acute stroke, stroke work up negative  Ambulating better, cleared by PT for discharge     Hyperkalemia K 7.1 POA  Likely recent aldactone use, high K diet  Resolved s/p kayexylate  Hold Aldactone and KCL   Serial labs     Liver cirrhosis POA  Ascites from portal HTN POA  Drained 3.5 liters ascites today  Has implanted peritoneal drain placed at OSH     Essential HTN POA  Holding coreg with hypotension     Overweight POA Body mass index is 27.25 kg/m².     Code Status: full code   Surrogate Decision Maker:     DVT Prophylaxis: SCD  GI Prophylaxis: not indicated     Baseline: independent        Subjective:     Chief Complaint / Reason for Physician Visit f/u hyponatremia, HTN  \"Ready to go home\". Discussed with RN events overnight. feels better, steady getting up, no dizziness  BP back to usual baseline    Review of Systems:  Symptom Y/N Comments  Symptom Y/N Comments   Fever/Chills n   Chest Pain n    Poor Appetite    Edema     Cough n   Abdominal Pain n    Sputum    Joint Pain     SOB/BEYER n   Headache     Nausea/vomit n   Tolerating PT/OT     Diarrhea n   Tolerating Diet y    Constipation    Other       Could NOT obtain due to:      Objective:     VITALS:   Last 24hrs VS reviewed since prior progress note.  Most recent are:  Patient Vitals for the past 24 hrs:   Temp Pulse Resp BP SpO2   11/16/18 0743 97.6 °F (36.4 °C) 85 20 92/59 97 %   11/16/18 0351 98.2 °F (36.8 °C) 83 20 90/59 94 %   11/15/18 2342 98 °F (36.7 °C) 84 20 96/60 96 %   11/15/18 1944 97.7 °F (36.5 °C) 78 18 98/61 98 %   11/15/18 1519 97.6 °F (36.4 °C) 83 18 (!) 81/60 95 %   11/15/18 1201 98.3 °F (36.8 °C) 85 18 94/63 98 %       Intake/Output Summary (Last 24 hours) at 11/16/2018 1028  Last data filed at 11/16/2018 0943  Gross per 24 hour   Intake 910 ml   Output 200 ml   Net 710 ml        Wt Readings from Last 12 Encounters:   11/16/18 83.7 kg (184 lb 8.4 oz)   11/13/18 77.1 kg (169 lb 15.6 oz)   11/12/18 77.1 kg (170 lb)   09/06/18 82.1 kg (180 lb 14.4 oz)   08/20/18 87.1 kg (192 lb)   07/26/18 89 kg (196 lb 1.6 oz)   05/24/18 93.9 kg (207 lb)   04/12/18 91.6 kg (202 lb)   03/29/18 91.9 kg (202 lb 11.2 oz)   03/17/18 86.2 kg (190 lb)   03/09/18 94.3 kg (208 lb)   02/01/18 92.2 kg (203 lb 3.2 oz)       PHYSICAL EXAM:  General: WD, WN. Alert, cooperative, no acute distress    EENT:  PERRL. Anicteric sclerae. MMM  Neck:  No meningismus, no thyromegaly  Resp:  CTA bilaterally, no wheezing or rales. No accessory muscle use  CV:  Regular  rhythm,  No edema  GI:  Soft, Non distended, Non tender.  +Bowel sounds, no rebound`    Peritoneal drain in place  Neurologic:  Alert and oriented X 3, normal speech, non focal motor exam  Psych:   Good insight. Not anxious nor agitated  Skin:  No rashes. No jaundice    Reviewed most current lab test results and cultures  YES  Reviewed most current radiology test results   YES  Review and summation of old records today    NO  Reviewed patient's current orders and MAR    YES  PMH/ reviewed - no change compared to H&P  ________________________________________________________________________  Care Plan discussed with:    Comments   Patient x    Family  x niece   RN x    Care Manager     Consultant                       x Multidiciplinary team rounds were held today with , nursing, pharmacist and clinical coordinator. Patient's plan of care was discussed; medications were reviewed and discharge planning was addressed. ________________________________________________________________________      Comments   >50% of visit spent in counseling and coordination of care     ________________________________________________________________________  Ray Robison MD     Procedures: see electronic medical records for all procedures/Xrays and details which were not copied into this note but were reviewed prior to creation of Plan. LABS:  I reviewed today's most current labs and imaging studies.   Pertinent labs include:  Recent Labs     11/15/18  0337 11/14/18  0353   WBC 5.9 7.0   HGB 12.7 12.9   HCT 36.5* 37.4   PLT 127* 140*     Recent Labs     11/16/18  0412 11/15/18  0337 11/14/18  1902 11/14/18  0353   * 129* 127* 125*   K 3.7 3.6 5.0 5.2*   CL 99 99 98 98   CO2 23 21 23 21   GLU 84 111* 100 85   BUN 22* 21* 23* 21*   CREA 0.87 0.90 0.89 0.80   CA 7.4* 7.8* 7.5* 7.3*   MG 2.0  --   --   --    PHOS 3.5 3.6  --  3.2   ALB 1.5* 1.7*  --  1.6*   TBILI  --  1.0  --  1.2*   SGOT  --  55*  --  50*   ALT  --  30  --  27

## 2018-11-16 NOTE — ROUTINE PROCESS
Bedside shift change report given to [facility] SINA RN (oncoming nurse) by Andrew RN (offgoing nurse). Report included the following information SBAR, Kardex, Intake/Output, MAR and Recent Results. 
  
Zone Phone: [phone] 
  
  
Significant changes during shift:  fluid restriction, IV bolus given, BP still low 
  
  
Patient Information 
  
Belkys Dasilva 
77 y.o. 
11/13/2018  1:31 AM by Diego Valadez MD. David Moore was admitted from Home 
  
Problem List 
  
       
Patient Active Problem List  
  Diagnosis Date Noted  Hyperkalemia 11/13/2018  Bilateral carotid artery stenosis 11/13/2018  Dizziness and giddiness 11/13/2018  Ascites due to chronic alcoholic hepatitis 97/79/8396  Colon AV malformation 10/03/2014  Diverticulosis of colon 10/03/2014  Colon polyps 10/03/2014  S/P inguinal hernia repair 08/04/2014  Cirrhosis, alcoholic ([facility] [address] 32/21/3207  S/P hip replacement 03/07/2013  
  
       
Past Medical History:  
Diagnosis Date  Arrhythmia    
  intermittent tachycardia  Breast lump    
  bilateral 6-8 weeks  Congestive heart failure (HCC)    
 Hypertension    
 Liver disease    
  alcoholic cirrhosis  Psychiatric disorder    
  anxiety and depression  
  
  
  
Core Measures: 
  
CVA: Yes Yes 
  
Activity Status: 
  
OOB to Chair No 
Ambulated this shift Yes Bed Rest No 
  
DVT prophylaxis: 
  
DVT prophylaxis Med- No 
DVT prophylaxis SCD or NEMESIO- Yes  
  
Wounds: (If Applicable) 
  
Wounds- No 
  
Patient Safety: 
  
Falls Score Total Score: 2 Safety Level_______ Bed Alarm On? No 
Sitter? No 
  
Plan for upcoming shift: safety, electrolytes   
  
  
Discharge Plan: No  
  
Active Consults: 
IP CONSULT TO HOSPITALIST 
IP CONSULT TO NEUROLOGY 
IP CONSULT TO NEPHROLOGY

## 2018-11-16 NOTE — DISCHARGE INSTRUCTIONS
Patient Discharge Instructions    Quintin Hurtado / [id number] : 1950    Admitted 2018 Discharged: 2018         DISCHARGE DIAGNOSIS:       Hyperkalemia (2018)      Hyponatremia (11/15/2018)      Cirrhosis           What to do at Home    1. Recommended diet: Cardiac Diet, low salt diet    2. Recommended activity: Activity as tolerated    3. If you experience any of the following symptoms then please call your primary care physician or return to the emergency room if you cannot get hold of your doctor:   Fevers > 100.5, chills   Nausea or vomiting, persistent diarrhea > 24 hours   Blood in stool or black stools   Chest pain or SOB      Follow-up Information     Follow up With Specialties Details Why Contact Info    Blanca Ibanez MD Internal Medicine Go on 2018 Please follow up on 2018 at 10:00 [address]  PT, OT and Nursing services  7[address] [facility]  [address]    James Shoemaker MD Gastroenterology  if you want to see a GI specialist in [place] to help manage the liver disease 8283 [facility] 1634 [facility]  [phone]          Low salt diet    Have PCP check serum sodium and potassium at next visit    Information obtained by :  I understand that if any problems occur once I am at home I am to contact my physician. I understand and acknowledge receipt of the instructions indicated above.                                                                                                                                            Physician's or R.N.'s Signature                                                                  Date/Time                                                                                                                                              Patient or Representative Signature Date/Time

## 2018-11-16 NOTE — PROGRESS NOTES
Hospitalist Progress NoteNAME: lOivia Gomez :  1950 MRN:  [id number] Admit date: 2018 Today's date: 11/15/18 PCP: MD Yessy Landry M.D. Cell 610- Assessment / Plan: Hypotension today Chronic component, SBP ranges 90 to 110 Dropped to 80s today, not symptomatic Gentle IVF Add midodrine If BP and Na stable, plan discharge home in AM 
  
Hyponatremia Na 122 POA High urine osmolarity and low urine Na Held diuretics at admit S/P IVF 
PO fluid restrict, salt tablets Nephrology consult  Following Improved to 129 Gait instability, chronic intermittent blurry vision POA Clinically improved Suspect related to electrolyte abnormalities Neuro consult followed MRI brain negative for acute stroke Ambulating better, cleared by PT for discharge 
  
Hyperkalemia K 7.1 POA Likely recent aldactone use, high K diet Resolved s/p kayexylate Hold Aldactone and KCL Serial labs 
  
Liver cirrhosis POA Ascites from portal HTN POA Drained 3.5 liters ascites today Has implanted peritoneal drain 
  
Essential HTN POA Holding coreg with hypotension 
  
Overweight POA Body mass index is 27.47 kg/m². 
  
Code Status: full code Surrogate Decision Maker: 
  
DVT Prophylaxis: SCD 
GI Prophylaxis: not indicated 
  
Baseline: independent Subjective: Chief Complaint / Reason for Physician Visit f/u hyponatremia, HTN \"feeling better\". Discussed with RN events overnight. Overall feels better, more steady getting up Drained 3.5 liters ascites today Hypotension today, denies lightheadedness Review of Systems: 
Symptom Y/N Comments  Symptom Y/N Comments Fever/Chills n   Chest Pain n   
Poor Appetite    Edema Cough n   Abdominal Pain n   
Sputum    Joint Pain SOB/BEYER n   Headache Nausea/vomit n   Tolerating PT/OT Diarrhea n   Tolerating Diet y Constipation    Other Could NOT obtain due to: Objective: VITALS:  
Last 24hrs VS reviewed since prior progress note. Most recent are: 
Patient Vitals for the past 24 hrs: 
 Temp Pulse Resp BP SpO2  
11/15/18 1944 97.7 °F (36.5 °C) 78 18 98/61 98 % 11/15/18 1519 97.6 °F (36.4 °C) 83 18 (!) 81/60 95 % 11/15/18 1201 98.3 °F (36.8 °C) 85 18 94/63 98 % 11/15/18 0815 98.8 °F (37.1 °C) 89 18 (!) 82/59 97 % 11/15/18 0346 97.7 °F (36.5 °C) 90 18 (!) 84/61 96 % 11/14/18 2331 96.8 °F (36 °C) 93 18 110/68 97 % Intake/Output Summary (Last 24 hours) at 11/15/2018 2137 Last data filed at 11/15/2018 2663 Gross per 24 hour Intake 461 ml Output 200 ml Net 261 ml Wt Readings from Last 12 Encounters:  
11/15/18 84.4 kg (186 lb)  
11/13/18 77.1 kg (169 lb 15.6 oz)  
11/12/18 77.1 kg (170 lb) 09/06/18 82.1 kg (180 lb 14.4 oz) 08/20/18 87.1 kg (192 lb)  
07/26/18 89 kg (196 lb 1.6 oz) 05/24/18 93.9 kg (207 lb) 04/12/18 91.6 kg (202 lb)  
03/29/18 91.9 kg (202 lb 11.2 oz) 03/17/18 86.2 kg (190 lb) 03/09/18 94.3 kg (208 lb) 02/01/18 92.2 kg (203 lb 3.2 oz) PHYSICAL EXAM: 
General: WD, WN. Alert, cooperative, no acute distress   
EENT:  PERRL. Anicteric sclerae. MMM Neck:  No meningismus, no thyromegaly Resp:  CTA bilaterally, no wheezing or rales. No accessory muscle use CV:  Regular  rhythm,  No edema GI:  Soft, Non distended, Non tender.  +Bowel sounds, no rebound` Peritoneal drain in place Neurologic:  Alert and oriented X 3, normal speech, non focal motor exam 
Psych:   Good insight. Not anxious nor agitated Skin:  No rashes. No jaundice Reviewed most current lab test results and cultures  YES Reviewed most current radiology test results   YES Review and summation of old records today    NO Reviewed patient's current orders and MAR    YES 
PMH/ reviewed - no change compared to H&P 
________________________________________________________________________ Care Plan discussed with: 
  Comments Patient x Family  x niece RN x Care Manager Consultant     
                 x Multidiciplinary team rounds were held today with , nursing, pharmacist and clinical coordinator. Patient's plan of care was discussed; medications were reviewed and discharge planning was addressed. ________________________________________________________________________ Total NON critical care TIME:  25  Minutes Total CRITICAL CARE TIME Spent:   Minutes non procedure based Comments >50% of visit spent in counseling and coordination of care    
________________________________________________________________________ Tom Collins MD  
 
Procedures: see electronic medical records for all procedures/Xrays and details which were not copied into this note but were reviewed prior to creation of Plan. LABS: 
I reviewed today's most current labs and imaging studies. Pertinent labs include: 
Recent Labs 11/15/18 
2473 11/14/18 
0353 11/13/18 
0404 WBC 5.9 7.0 8.8 HGB 12.7 12.9 13.5 HCT 36.5* 37.4 38.7 * 140* 155 Recent Labs 11/15/18 
2189 11/14/18 
1902 11/14/18 
0353  11/13/18 
0404 * 127* 125*   < > 127* K 3.6 5.0 5.2*   < > 5.6*  
CL 99 98 98   < > 100 CO2 21 23 21   < > 21 * 100 85   < > 106* BUN 21* 23* 21*   < > 21* CREA 0.90 0.89 0.80   < > 0.89 CA 7.8* 7.5* 7.3*   < > 8.1*  
PHOS 3.6  --  3.2  --   --   
ALB 1.7*  --  1.6*  --  1.8* TBILI 1.0  --  1.2*  --  1.6* SGOT 55*  --  50*  --  50* ALT 30  --  27  --  29  
 < > = values in this interval not displayed.

## 2018-11-16 NOTE — FACE TO FACE
Home Health Care Discharge Planning: [facility] Face to Face Encounter NAME: Vinny Cooper :  1950 MRN:  [id number] Primary Diagnosis: Hyperkalemia (2018) K 7.1 Hyponatremia (11/15/2018) Cirrhosis Date of Face to Face:  2018 10:32 AM        
                        
Face to Face Encounter findings are related to primary reason for home care:   YES 
 
1. I certify that the patient needs intermittent skilled nursing care, physical therapy and/or speech therapy. I will not be following this patient in the Community and Dr. Gerda Grace MD will be responsible for signing the [address] [facility] [address] 2. Initial Orders for Care: Skilled Nursing and Physical Therapy 3. I certify that this patient is homebound because of illness, need the aid of supportive devices such as walker and the assistance of another person in order to leave their place of residence. There exists a normal inability to leave home and leaving home requires a considerable and taxing effort. 4. I certify that this patient is under my care and that I had a Face-to-Face Encounter that meets the physician Face-to-Face Encounter requirements. Pt admitted to Hospital  From 2018 to 2018, I saw the patient on the day of discharge. Seen by Physical therapy and occupational therapy in house and recommended that home therapy be set up. Document the physical findings from the Face-to-Face Encounter that support the need for skilled services: Has new diagnosis that requires skilled nursing teaching and intervention , Has new medications that requires skilled nursing teaching and monitoring for understanding and compliance  and Has new finding of weakness and altered mobility that requires skilled physical therapy services for evaluation and interventions. Rya Robison MD 
Discharging Physician Office:  [phone] Fax:    [phone]

## 2018-11-16 NOTE — PROGRESS NOTES
Nephrology Progress Note Jim Finley  
 
[facility]. [facility]                  Phone - [phone] Patient: Maria Luisa Patel YOB: 1950     Admit Date: 11/13/2018 Date- 11/16/2018 CC: Follow up for  Hyperkalemia and hyponatremia Subjective: Interval History:  
-  Na improved Low bp after paracentesis yesterday 
k stable No c/o sob, chest pain, No c/o nausea or vomiting No c/o  fever. ROS- as above Assessment: 1. Hyperkalemia due to high potassium diet, potassium supplementation and Aldactone use. 2.  Hyponatremia, likely due to dehydration, although cirrhosis could be contributing to his hyponatremia.    
urine sodium was  Only 5 - urine osmo improved with ivf but NA < 5 
3. History of alcoholic cirrhosis. 4.  History of ascites requiring ascitic drain placement in 08/2018. 
5.  History of hypertension. now hypotension · Plan: · Fluid restriction 1200 ml per day · Stop  salt tabs · Continue midodrine · Can't give samsca with cirrhosis · Hold diuretics for now · Okay to d/c home today · Check bmp in 1 week · Physical Exam:  
General: NAD Resp:  Clear lungs, no wheezing or rales. CV:  Regular  rhythm,  S1,S2 GI:  Soft, Non distended, ascitic drain + Neurologic:  Alert and oriented X 3. Non Focal 
Skin:  no rashes or ulcers. No jaundice No edema Care Plan discussed with: patient, Jennifer Valdez Objective:  
Patient Vitals for the past 24 hrs: 
 Temp Pulse Resp BP SpO2  
11/16/18 0743 97.6 °F (36.4 °C) 85 20 92/59 97 % 11/16/18 0351 98.2 °F (36.8 °C) 83 20 90/59 94 % 11/15/18 2342 98 °F (36.7 °C) 84 20 96/60 96 % 11/15/18 1944 97.7 °F (36.5 °C) 78 18 98/61 98 % 11/15/18 1519 97.6 °F (36.4 °C) 83 18 (!) 81/60 95 % 11/15/18 1201 98.3 °F (36.8 °C) 85 18 94/63 98 % Last 3 Recorded Weights in this Encounter 11/13/18 1820 11/15/18 0346 11/16/18 4521 Weight: 81.6 kg (179 lb 14.3 oz) 84.4 kg (186 lb) 83.7 kg (184 lb 8.4 oz)  
 
11/14 1901 - 11/16 0700 In: 8698 [P.O.:1361] Out: 300 [Urine:300] Chart reviewed. Pertinent Notes reviewed. Medication list  reviewed Current Facility-Administered Medications Medication  midodrine (PROAMITINE) tablet 5 mg  sodium chloride tablet 1 g  
 magnesium oxide (MAG-OX) tablet 400 mg  
 sodium chloride (NS) flush 5-10 mL  sodium chloride (NS) flush 5-10 mL  ondansetron (ZOFRAN) injection 4 mg  bisacodyl (DULCOLAX) tablet 5 mg  sodium chloride (NS) flush 5-10 mL  sodium chloride (NS) flush 5-10 mL  aspirin chewable tablet 81 mg  
 labetalol (NORMODYNE;TRANDATE) 5 mg/mL injection 5 mg Data Review : 
Recent Labs 11/16/18 
3967 11/15/18 
7659 11/14/18 
1902 11/14/18 
0353 WBC  --  5.9  --  7.0 HGB  --  12.7  --  12.9 PLT  --  127*  --  140* * 129* 127* 125* K 3.7 3.6 5.0 5.2*  
GLU 84 111* 100 85 BUN 22* 21* 23* 21* CREA 0.87 0.90 0.89 0.80 ALT  --  30  --  27 SGOT  --  55*  --  50* TBILI  --  1.0  --  1.2* AP  --  150*  --  131* CA 7.4* 7.8* 7.5* 7.3*  
MG 2.0  --   --   --   
PHOS 3.5 3.6  --  3.2 Lab Results Component Value Date/Time Culture result: NO GROWTH 14 DAYS 02/08/2013 11:10 AM  
 Culture result: NO GROWTH 14 DAYS 02/08/2013 11:10 AM  
 Culture result: NO GROWTH 14 DAYS 12/07/2012 02:08 PM  
 Culture result: NO GROWTH 14 DAYS 12/07/2012 02:08 PM  
 
Lab Results Component Value Date/Time Specimen Description: WOUND DRAINAGE 02/08/2013 11:10 AM  
 Specimen Description: WOUND DRAINAGE 02/08/2013 11:10 AM  
 Specimen Description: HIP 12/07/2012 02:08 PM  
 Specimen Description: HIP 12/07/2012 02:08 PM  
 
No results for input(s): FE, TIBC, PSAT, FERR in the last 72 hours. Lab Results Component Value Date/Time  Sodium,urine random <5 11/14/2018 03:53 AM  
 Creatinine, urine 204.00 11/13/2018 11:21 AM  
 
 
 
 
 Juan Thomas MD 
[facility] 
 [email or web address]. [email or web address] [address] [employer] [address] [place], [address] Phone - [phone] Fax - [phone]

## 2018-11-16 NOTE — PROGRESS NOTES
8663 
Report received from Carlos Roger, [facility]. SBAR, Kardex, ED Summary, Procedure Summary, Intake/Output, MAR, Accordion, Recent Results, Med Rec Status and Cardiac Rhythm NSR were discussed. [address] [place] [address] Discharged patient. Reviewed all discharge instructions with patient including new and current medications as well as follow-up appointments. Patient verbalized understanding of all instructions. IV and tele removed. Patient escorted to car with all belongings.

## 2018-11-16 NOTE — PROGRESS NOTES
Home Health Care Discharge Planning: [facility] Face to Face Encounter NAME: Simon Schmitz :  1950 MRN:  [id number] Primary Diagnosis: Hyperkalemia (2018) K 7.1 Hyponatremia (11/15/2018) Cirrhosis Date of Face to Face:  2018 10:32 AM        
                        
Face to Face Encounter findings are related to primary reason for home care:   YES 
 
1. I certify that the patient needs intermittent skilled nursing care, physical therapy and/or speech therapy. I will not be following this patient in the Community and Dr. Franklyn Urban MD will be responsible for signing the [address] 2. Initial Orders for Care: Skilled Nursing and Physical Therapy 3. I certify that this patient is homebound because of illness, need the aid of supportive devices such as walker and the assistance of another person in order to leave their place of residence. There exists a normal inability to leave home and leaving home requires a considerable and taxing effort. 4. I certify that this patient is under my care and that I had a Face-to-Face Encounter that meets the physician Face-to-Face Encounter requirements. Pt admitted to Hospital  From 2018 to 2018, I saw the patient on the day of discharge. Seen by Physical therapy and occupational therapy in house and recommended that home therapy be set up. Document the physical findings from the Face-to-Face Encounter that support the need for skilled services: Has new diagnosis that requires skilled nursing teaching and intervention , Has new medications that requires skilled nursing teaching and monitoring for understanding and compliance  and Has new finding of weakness and altered mobility that requires skilled physical therapy services for evaluation and interventions. Danielle Flores MD 
Discharging Physician Office:  [phone] Fax:    [phone]

## 2018-11-16 NOTE — PROGRESS NOTES
Nephrology Progress Note Jim Finley  
 
[facility]. [facility]                  Phone - [phone] Patient: Ja Dela Cruz YOB: 1950     Admit Date: 11/13/2018 Date- 11/15/2018 CC: Follow up for  Hyperkalemia and hyponatremia Subjective: Interval History: -   
K down to 3.6 after Kayexalate yesterday Na up to 129 Urine osmo high Urine na remained low even after ivf. Pt feeling better after doing a paracentesis for 3.2 liter today (he does his own paracentesis) ROS: no fever/chills. No HEENT complaints. No SOB. No chest pain. No abd pain. No N/V. No skin rashes or lesions. No joint pain. Assessment: 1. Hyperkalemia due to high potassium diet, potassium supplementation and Aldactone use. K now down to 3.6. 2.  Hyponatremia, likely due to dehydration, although cirrhosis could be contributing to his hyponatremia.    
urine sodium was  Only 5 - urine osmo improved with ivf but NA < 5 
3. History of alcoholic cirrhosis. 4.  History of ascites requiring ascitic drain placement in 08/2018. 
5.  History of hypertension. ·  
 
 Plan: · Fluid restriction 1200 ml per day · Recheck labs in AM. 
· Start salt tabs · Change from a renal to a regular diet with K restriction · Can't give samsca with cirrhosis · May need hypertonic saline if na drops again · Very difficult situation. Physical Exam:  
GEN: elderly man in no distress NECK- Supple, no thyromegaly RESP: lungs clear, No accessory muscle use CVS: RRR; no gallop or rub SKIN: No Rash, or lesions ABDO: soft and non-tender; still some distention noted EXT: no edema NEURO: alert and appropriate; normal speech Care Plan discussed with: patient Objective:  
Patient Vitals for the past 24 hrs: 
 Temp Pulse Resp BP SpO2  
11/15/18 1944 97.7 °F (36.5 °C) 78 18 98/61 98 % 11/15/18 1519 97.6 °F (36.4 °C) 83 18 (!) 81/60 95 % 11/15/18 1201 98.3 °F (36.8 °C) 85 18 94/63 98 % 11/15/18 0815 98.8 °F (37.1 °C) 89 18 (!) 82/59 97 % 11/15/18 0346 97.7 °F (36.5 °C) 90 18 (!) 84/61 96 % 11/14/18 2331 96.8 °F (36 °C) 93 18 110/68 97 % Last 3 Recorded Weights in this Encounter 11/13/18 1820 11/15/18 5342 Weight: 81.6 kg (179 lb 14.3 oz) 84.4 kg (186 lb)  
 
11/14 0701 - 11/15 1900 In: 2081 [P.O.:2081] Out: 600 [Urine:600] Chart reviewed. Pertinent Notes reviewed. Medication list  reviewed Current Facility-Administered Medications Medication  midodrine (PROAMITINE) tablet 5 mg  sodium chloride tablet 1 g  
 magnesium oxide (MAG-OX) tablet 400 mg  
 sodium chloride (NS) flush 5-10 mL  sodium chloride (NS) flush 5-10 mL  ondansetron (ZOFRAN) injection 4 mg  bisacodyl (DULCOLAX) tablet 5 mg  sodium chloride (NS) flush 5-10 mL  sodium chloride (NS) flush 5-10 mL  aspirin chewable tablet 81 mg  
 labetalol (NORMODYNE;TRANDATE) 5 mg/mL injection 5 mg Data Review : 
Recent Labs 11/15/18 
2686 11/14/18 
1902 11/14/18 
0353 11/13/18 
1636 11/13/18 
0404 WBC 5.9  --  7.0  --  8.8 HGB 12.7  --  12.9  --  13.5 *  --  140*  --  155 * 127* 125* 127* 127* K 3.6 5.0 5.2* 4.9 5.6*  
* 100 85 102* 106* BUN 21* 23* 21* 22* 21* CREA 0.90 0.89 0.80 0.88 0.89 ALT 30  --  27  --  29 SGOT 55*  --  50*  --  50* TBILI 1.0  --  1.2*  --  1.6* *  --  131*  --  141* CA 7.8* 7.5* 7.3* 7.6* 8.1*  
PHOS 3.6  --  3.2  --   --   
 
Lab Results Component Value Date/Time Culture result: NO GROWTH 14 DAYS 02/08/2013 11:10 AM  
 Culture result: NO GROWTH 14 DAYS 02/08/2013 11:10 AM  
 Culture result: NO GROWTH 14 DAYS 12/07/2012 02:08 PM  
 Culture result: NO GROWTH 14 DAYS 12/07/2012 02:08 PM  
 
Lab Results Component Value Date/Time  Specimen Description: WOUND DRAINAGE 02/08/2013 11:10 AM  
 Specimen Description: WOUND DRAINAGE 02/08/2013 11:10 AM  
 Specimen Description: HIP 12/07/2012 02:08 PM  
 Specimen Description: HIP 12/07/2012 02:08 PM  
 
No results for input(s): FE, TIBC, PSAT, FERR in the last 72 hours. Lab Results Component Value Date/Time Sodium,urine random <5 11/14/2018 03:53 AM  
 Creatinine, urine 204.00 11/13/2018 11:21 AM  
 
 
 
 
Deandre Hedrick MD 
[facility] 
 www. [facility] [employer] [facility] [address] [place], [address] [facility] Phone - [phone] Fax - [phone]

## 2018-11-18 ENCOUNTER — HOME CARE VISIT (OUTPATIENT)
Dept: SCHEDULING | Facility: HOME HEALTH | Age: 68
End: 2018-11-18
Payer: MEDICARE

## 2018-11-18 ENCOUNTER — HOME CARE VISIT (OUTPATIENT)
Dept: HOME HEALTH SERVICES | Facility: HOME HEALTH | Age: 68
End: 2018-11-18
Payer: MEDICARE

## 2018-11-18 PROCEDURE — G0299 HHS/HOSPICE OF RN EA 15 MIN: HCPCS

## 2018-11-18 PROCEDURE — 3331090001 HH PPS REVENUE CREDIT

## 2018-11-18 PROCEDURE — 3331090002 HH PPS REVENUE DEBIT

## 2018-11-18 PROCEDURE — 400013 HH SOC

## 2018-11-19 VITALS
HEART RATE: 100 BPM | SYSTOLIC BLOOD PRESSURE: 98 MMHG | DIASTOLIC BLOOD PRESSURE: 60 MMHG | TEMPERATURE: 97.9 F | RESPIRATION RATE: 20 BRPM

## 2018-11-19 PROCEDURE — 3331090002 HH PPS REVENUE DEBIT

## 2018-11-19 PROCEDURE — 3331090001 HH PPS REVENUE CREDIT

## 2018-11-20 ENCOUNTER — HOME CARE VISIT (OUTPATIENT)
Dept: SCHEDULING | Facility: HOME HEALTH | Age: 68
End: 2018-11-20
Payer: MEDICARE

## 2018-11-20 PROCEDURE — 3331090002 HH PPS REVENUE DEBIT

## 2018-11-20 PROCEDURE — 3331090001 HH PPS REVENUE CREDIT

## 2018-11-20 PROCEDURE — G0151 HHCP-SERV OF PT,EA 15 MIN: HCPCS

## 2018-11-20 PROCEDURE — G0299 HHS/HOSPICE OF RN EA 15 MIN: HCPCS

## 2018-11-21 PROCEDURE — 3331090002 HH PPS REVENUE DEBIT

## 2018-11-21 PROCEDURE — 3331090001 HH PPS REVENUE CREDIT

## 2018-11-22 PROCEDURE — 3331090002 HH PPS REVENUE DEBIT

## 2018-11-22 PROCEDURE — 3331090001 HH PPS REVENUE CREDIT

## 2018-11-23 PROCEDURE — 3331090002 HH PPS REVENUE DEBIT

## 2018-11-23 PROCEDURE — 3331090001 HH PPS REVENUE CREDIT

## 2018-11-24 PROCEDURE — 3331090002 HH PPS REVENUE DEBIT

## 2018-11-24 PROCEDURE — 3331090001 HH PPS REVENUE CREDIT

## 2018-11-25 VITALS
BODY MASS INDEX: 26.88 KG/M2 | RESPIRATION RATE: 20 BRPM | WEIGHT: 182 LBS | DIASTOLIC BLOOD PRESSURE: 68 MMHG | TEMPERATURE: 97.8 F | OXYGEN SATURATION: 98 % | HEART RATE: 78 BPM | SYSTOLIC BLOOD PRESSURE: 98 MMHG

## 2018-11-25 PROCEDURE — 3331090001 HH PPS REVENUE CREDIT

## 2018-11-25 PROCEDURE — 3331090002 HH PPS REVENUE DEBIT

## 2018-11-26 PROCEDURE — 3331090001 HH PPS REVENUE CREDIT

## 2018-11-26 PROCEDURE — 3331090002 HH PPS REVENUE DEBIT

## 2018-11-27 ENCOUNTER — HOME CARE VISIT (OUTPATIENT)
Dept: SCHEDULING | Facility: HOME HEALTH | Age: 68
End: 2018-11-27
Payer: MEDICARE

## 2018-11-27 VITALS
HEART RATE: 118 BPM | WEIGHT: 192 LBS | TEMPERATURE: 98 F | DIASTOLIC BLOOD PRESSURE: 60 MMHG | RESPIRATION RATE: 20 BRPM | OXYGEN SATURATION: 98 % | BODY MASS INDEX: 28.35 KG/M2 | SYSTOLIC BLOOD PRESSURE: 98 MMHG

## 2018-11-27 PROCEDURE — G0299 HHS/HOSPICE OF RN EA 15 MIN: HCPCS

## 2018-11-27 PROCEDURE — 3331090001 HH PPS REVENUE CREDIT

## 2018-11-27 PROCEDURE — 3331090002 HH PPS REVENUE DEBIT

## 2018-11-28 ENCOUNTER — HOME CARE VISIT (OUTPATIENT)
Dept: HOME HEALTH SERVICES | Facility: HOME HEALTH | Age: 68
End: 2018-11-28
Payer: MEDICARE

## 2018-11-28 PROCEDURE — 3331090001 HH PPS REVENUE CREDIT

## 2018-11-28 PROCEDURE — 3331090002 HH PPS REVENUE DEBIT

## 2018-11-29 ENCOUNTER — HOME CARE VISIT (OUTPATIENT)
Dept: HOME HEALTH SERVICES | Facility: HOME HEALTH | Age: 68
End: 2018-11-29
Payer: MEDICARE

## 2018-11-29 ENCOUNTER — HOME CARE VISIT (OUTPATIENT)
Dept: SCHEDULING | Facility: HOME HEALTH | Age: 68
End: 2018-11-29
Payer: MEDICARE

## 2018-11-29 PROCEDURE — 3331090002 HH PPS REVENUE DEBIT

## 2018-11-29 PROCEDURE — 3331090001 HH PPS REVENUE CREDIT

## 2018-11-29 PROCEDURE — G0157 HHC PT ASSISTANT EA 15: HCPCS

## 2018-11-30 ENCOUNTER — HOME CARE VISIT (OUTPATIENT)
Dept: HOME HEALTH SERVICES | Facility: HOME HEALTH | Age: 68
End: 2018-11-30
Payer: MEDICARE

## 2018-11-30 ENCOUNTER — HOME CARE VISIT (OUTPATIENT)
Dept: SCHEDULING | Facility: HOME HEALTH | Age: 68
End: 2018-11-30
Payer: MEDICARE

## 2018-11-30 PROCEDURE — G0299 HHS/HOSPICE OF RN EA 15 MIN: HCPCS

## 2018-11-30 PROCEDURE — 3331090001 HH PPS REVENUE CREDIT

## 2018-11-30 PROCEDURE — 3331090002 HH PPS REVENUE DEBIT

## 2018-12-01 VITALS
DIASTOLIC BLOOD PRESSURE: 60 MMHG | HEART RATE: 108 BPM | RESPIRATION RATE: 18 BRPM | TEMPERATURE: 97.3 F | OXYGEN SATURATION: 98 % | SYSTOLIC BLOOD PRESSURE: 90 MMHG

## 2018-12-01 PROCEDURE — 3331090002 HH PPS REVENUE DEBIT

## 2018-12-01 PROCEDURE — 3331090001 HH PPS REVENUE CREDIT

## 2018-12-02 PROCEDURE — 3331090002 HH PPS REVENUE DEBIT

## 2018-12-02 PROCEDURE — 3331090001 HH PPS REVENUE CREDIT

## 2018-12-03 ENCOUNTER — HOME CARE VISIT (OUTPATIENT)
Dept: SCHEDULING | Facility: HOME HEALTH | Age: 68
End: 2018-12-03
Payer: MEDICARE

## 2018-12-03 ENCOUNTER — HOME CARE VISIT (OUTPATIENT)
Dept: HOME HEALTH SERVICES | Facility: HOME HEALTH | Age: 68
End: 2018-12-03
Payer: MEDICARE

## 2018-12-03 VITALS
TEMPERATURE: 97.5 F | WEIGHT: 180 LBS | SYSTOLIC BLOOD PRESSURE: 80 MMHG | RESPIRATION RATE: 18 BRPM | DIASTOLIC BLOOD PRESSURE: 52 MMHG | OXYGEN SATURATION: 98 % | HEART RATE: 100 BPM | BODY MASS INDEX: 26.58 KG/M2

## 2018-12-03 VITALS — OXYGEN SATURATION: 99 % | SYSTOLIC BLOOD PRESSURE: 98 MMHG | HEART RATE: 120 BPM | DIASTOLIC BLOOD PRESSURE: 68 MMHG

## 2018-12-03 PROBLEM — E87.1 HYPONATREMIA: Chronic | Status: ACTIVE | Noted: 2018-11-15

## 2018-12-03 PROBLEM — E87.6 HYPOKALEMIA: Status: ACTIVE | Noted: 2018-12-03

## 2018-12-03 PROBLEM — E86.0 DEHYDRATION: Status: ACTIVE | Noted: 2018-12-03

## 2018-12-03 PROCEDURE — G0299 HHS/HOSPICE OF RN EA 15 MIN: HCPCS

## 2018-12-03 PROCEDURE — 3331090002 HH PPS REVENUE DEBIT

## 2018-12-03 PROCEDURE — 3331090001 HH PPS REVENUE CREDIT

## 2018-12-04 PROBLEM — R42 DIZZINESS AND GIDDINESS: Chronic | Status: ACTIVE | Noted: 2018-11-13

## 2018-12-04 PROCEDURE — 3331090001 HH PPS REVENUE CREDIT

## 2018-12-04 PROCEDURE — 3331090002 HH PPS REVENUE DEBIT

## 2018-12-05 PROCEDURE — 3331090001 HH PPS REVENUE CREDIT

## 2018-12-05 PROCEDURE — 3331090002 HH PPS REVENUE DEBIT

## 2018-12-06 PROCEDURE — 3331090002 HH PPS REVENUE DEBIT

## 2018-12-06 PROCEDURE — 3331090001 HH PPS REVENUE CREDIT

## 2018-12-07 ENCOUNTER — HOME CARE VISIT (OUTPATIENT)
Dept: SCHEDULING | Facility: HOME HEALTH | Age: 68
End: 2018-12-07
Payer: MEDICARE

## 2018-12-07 ENCOUNTER — HOME CARE VISIT (OUTPATIENT)
Dept: HOME HEALTH SERVICES | Facility: HOME HEALTH | Age: 68
End: 2018-12-07
Payer: MEDICARE

## 2018-12-07 VITALS
DIASTOLIC BLOOD PRESSURE: 62 MMHG | SYSTOLIC BLOOD PRESSURE: 100 MMHG | TEMPERATURE: 97.4 F | WEIGHT: 195 LBS | OXYGEN SATURATION: 97 % | BODY MASS INDEX: 28.8 KG/M2 | RESPIRATION RATE: 18 BRPM | HEART RATE: 102 BPM

## 2018-12-07 PROCEDURE — G0299 HHS/HOSPICE OF RN EA 15 MIN: HCPCS

## 2018-12-07 PROCEDURE — 3331090002 HH PPS REVENUE DEBIT

## 2018-12-07 PROCEDURE — 3331090001 HH PPS REVENUE CREDIT

## 2018-12-08 PROCEDURE — 3331090001 HH PPS REVENUE CREDIT

## 2018-12-08 PROCEDURE — 3331090002 HH PPS REVENUE DEBIT

## 2018-12-09 PROCEDURE — 3331090001 HH PPS REVENUE CREDIT

## 2018-12-09 PROCEDURE — 3331090002 HH PPS REVENUE DEBIT

## 2018-12-10 ENCOUNTER — HOME CARE VISIT (OUTPATIENT)
Dept: SCHEDULING | Facility: HOME HEALTH | Age: 68
End: 2018-12-10
Payer: MEDICARE

## 2018-12-10 ENCOUNTER — HOME CARE VISIT (OUTPATIENT)
Dept: HOME HEALTH SERVICES | Facility: HOME HEALTH | Age: 68
End: 2018-12-10
Payer: MEDICARE

## 2018-12-10 VITALS
WEIGHT: 180 LBS | SYSTOLIC BLOOD PRESSURE: 100 MMHG | OXYGEN SATURATION: 98 % | RESPIRATION RATE: 18 BRPM | BODY MASS INDEX: 26.58 KG/M2 | DIASTOLIC BLOOD PRESSURE: 60 MMHG | TEMPERATURE: 98.1 F | HEART RATE: 108 BPM

## 2018-12-10 PROCEDURE — G0299 HHS/HOSPICE OF RN EA 15 MIN: HCPCS

## 2018-12-10 PROCEDURE — 3331090001 HH PPS REVENUE CREDIT

## 2018-12-10 PROCEDURE — 3331090002 HH PPS REVENUE DEBIT

## 2018-12-11 PROCEDURE — 3331090001 HH PPS REVENUE CREDIT

## 2018-12-11 PROCEDURE — 3331090002 HH PPS REVENUE DEBIT

## 2018-12-12 PROCEDURE — 3331090001 HH PPS REVENUE CREDIT

## 2018-12-12 PROCEDURE — 3331090002 HH PPS REVENUE DEBIT

## 2018-12-13 ENCOUNTER — HOME CARE VISIT (OUTPATIENT)
Dept: SCHEDULING | Facility: HOME HEALTH | Age: 68
End: 2018-12-13
Payer: MEDICARE

## 2018-12-13 VITALS
OXYGEN SATURATION: 98 % | BODY MASS INDEX: 27.76 KG/M2 | TEMPERATURE: 97.4 F | SYSTOLIC BLOOD PRESSURE: 92 MMHG | DIASTOLIC BLOOD PRESSURE: 60 MMHG | RESPIRATION RATE: 18 BRPM | HEART RATE: 104 BPM | WEIGHT: 188 LBS

## 2018-12-13 PROCEDURE — 3331090001 HH PPS REVENUE CREDIT

## 2018-12-13 PROCEDURE — 3331090002 HH PPS REVENUE DEBIT

## 2018-12-13 PROCEDURE — G0299 HHS/HOSPICE OF RN EA 15 MIN: HCPCS

## 2018-12-14 PROCEDURE — 3331090002 HH PPS REVENUE DEBIT

## 2018-12-14 PROCEDURE — 3331090001 HH PPS REVENUE CREDIT

## 2018-12-15 PROCEDURE — 3331090001 HH PPS REVENUE CREDIT

## 2018-12-15 PROCEDURE — 3331090002 HH PPS REVENUE DEBIT

## 2018-12-16 PROCEDURE — 3331090001 HH PPS REVENUE CREDIT

## 2018-12-16 PROCEDURE — 3331090002 HH PPS REVENUE DEBIT

## 2018-12-17 PROCEDURE — 3331090001 HH PPS REVENUE CREDIT

## 2018-12-17 PROCEDURE — 3331090002 HH PPS REVENUE DEBIT

## 2018-12-18 ENCOUNTER — HOME CARE VISIT (OUTPATIENT)
Dept: SCHEDULING | Facility: HOME HEALTH | Age: 68
End: 2018-12-18
Payer: MEDICARE

## 2018-12-18 VITALS
OXYGEN SATURATION: 98 % | RESPIRATION RATE: 18 BRPM | BODY MASS INDEX: 26.58 KG/M2 | TEMPERATURE: 96.1 F | WEIGHT: 180 LBS | HEART RATE: 110 BPM | DIASTOLIC BLOOD PRESSURE: 60 MMHG | SYSTOLIC BLOOD PRESSURE: 98 MMHG

## 2018-12-18 PROCEDURE — G0299 HHS/HOSPICE OF RN EA 15 MIN: HCPCS

## 2018-12-18 PROCEDURE — 3331090001 HH PPS REVENUE CREDIT

## 2018-12-18 PROCEDURE — 3331090002 HH PPS REVENUE DEBIT

## 2018-12-19 PROCEDURE — 3331090001 HH PPS REVENUE CREDIT

## 2018-12-19 PROCEDURE — 3331090002 HH PPS REVENUE DEBIT

## 2018-12-20 PROCEDURE — 3331090002 HH PPS REVENUE DEBIT

## 2018-12-20 PROCEDURE — 3331090001 HH PPS REVENUE CREDIT

## 2018-12-21 ENCOUNTER — HOME CARE VISIT (OUTPATIENT)
Dept: SCHEDULING | Facility: HOME HEALTH | Age: 68
End: 2018-12-21
Payer: MEDICARE

## 2018-12-21 VITALS
OXYGEN SATURATION: 98 % | TEMPERATURE: 97.6 F | HEART RATE: 104 BPM | RESPIRATION RATE: 20 BRPM | SYSTOLIC BLOOD PRESSURE: 90 MMHG | DIASTOLIC BLOOD PRESSURE: 60 MMHG

## 2018-12-21 PROCEDURE — 3331090001 HH PPS REVENUE CREDIT

## 2018-12-21 PROCEDURE — G0299 HHS/HOSPICE OF RN EA 15 MIN: HCPCS

## 2018-12-21 PROCEDURE — 3331090002 HH PPS REVENUE DEBIT

## 2018-12-22 PROCEDURE — 3331090002 HH PPS REVENUE DEBIT

## 2018-12-22 PROCEDURE — 3331090001 HH PPS REVENUE CREDIT

## 2018-12-23 PROCEDURE — 3331090001 HH PPS REVENUE CREDIT

## 2018-12-23 PROCEDURE — 3331090002 HH PPS REVENUE DEBIT
